# Patient Record
Sex: MALE | Race: WHITE | NOT HISPANIC OR LATINO | Employment: OTHER | URBAN - METROPOLITAN AREA
[De-identification: names, ages, dates, MRNs, and addresses within clinical notes are randomized per-mention and may not be internally consistent; named-entity substitution may affect disease eponyms.]

---

## 2017-02-16 ENCOUNTER — OFFICE VISIT (OUTPATIENT)
Dept: FAMILY MEDICINE CLINIC | Facility: CLINIC | Age: 67
End: 2017-02-16

## 2017-02-16 VITALS
HEART RATE: 82 BPM | HEIGHT: 73 IN | BODY MASS INDEX: 25.42 KG/M2 | SYSTOLIC BLOOD PRESSURE: 118 MMHG | DIASTOLIC BLOOD PRESSURE: 88 MMHG | OXYGEN SATURATION: 99 % | TEMPERATURE: 97.6 F | WEIGHT: 191.8 LBS

## 2017-02-16 DIAGNOSIS — J01.00 ACUTE NON-RECURRENT MAXILLARY SINUSITIS: Primary | ICD-10-CM

## 2017-02-16 PROCEDURE — 99213 OFFICE O/P EST LOW 20 MIN: CPT | Performed by: FAMILY MEDICINE

## 2017-02-16 RX ORDER — AZITHROMYCIN 250 MG/1
TABLET, FILM COATED ORAL
Qty: 6 TABLET | Refills: 0 | Status: SHIPPED | OUTPATIENT
Start: 2017-02-16 | End: 2017-11-27

## 2017-02-16 NOTE — PROGRESS NOTES
"  Chief Complaint   Patient presents with   • Nasal Congestion   • Cough   • Fatigue       Upper Respiratory Infection: Patient complains of symptoms of a URI, possible sinusitis. Symptoms include congestion, cough, fever and sore throat. Onset of symptoms was 2 weeks ago, gradually worsening since that time. He also c/o achiness, post nasal drip and productive cough with  yellow colored sputum for the past 1 week .  He is drinking plenty of fluids. Evaluation to date: none. Treatment to date: antihistamines and decongestants.    Vitals:    02/16/17 1027   BP: 118/88   BP Location: Left arm   Patient Position: Sitting   Cuff Size: Adult   Pulse: 82   Temp: 97.6 °F (36.4 °C)   TempSrc: Oral   SpO2: 99%   Weight: 191 lb 12.8 oz (87 kg)   Height: 73\" (185.4 cm)     Gen: mildly ill appearing, alert  Ears: Tm's bulging without redness  Nose:  Congestion  Throat:  Red without exudate, some drainage, tonsils okay  Neck: no LAD  Lung: , good air movement, regular RR  Heart: RR without murmur  Skin: no rash.      Assessment/Plan   Tim was seen today for nasal congestion, cough and fatigue.    Diagnoses and all orders for this visit:    Acute non-recurrent maxillary sinusitis  -     azithromycin (ZITHROMAX) 250 MG tablet; Take 2 tablets the first day, then 1 tablet daily for 4 days.             Tylenol or Advil as needed for pain, fever, muscle aches  Plenty of fluids  Hand washing discussed  Off work or school note given if needed.  Warm tea for throat.    Dr. Gagandeep Romero MD  [place], [place]  [facility]  "

## 2017-03-20 ENCOUNTER — OUTSIDE FACILITY SERVICE (OUTPATIENT)
Dept: SURGERY | Facility: CLINIC | Age: 67
End: 2017-03-20

## 2017-03-20 PROCEDURE — G0105 COLORECTAL SCRN; HI RISK IND: HCPCS | Performed by: SURGERY

## 2017-11-11 DIAGNOSIS — I10 ESSENTIAL HYPERTENSION: ICD-10-CM

## 2017-11-13 DIAGNOSIS — I10 ESSENTIAL HYPERTENSION: ICD-10-CM

## 2017-11-13 RX ORDER — LISINOPRIL 40 MG/1
TABLET ORAL
Qty: 30 TABLET | Refills: 0 | Status: SHIPPED | OUTPATIENT
Start: 2017-11-13 | End: 2017-11-27 | Stop reason: SDUPTHER

## 2017-11-13 RX ORDER — LISINOPRIL 40 MG/1
TABLET ORAL
Qty: 30 TABLET | Refills: 0 | Status: SHIPPED | OUTPATIENT
Start: 2017-11-13 | End: 2017-11-27

## 2017-11-20 DIAGNOSIS — R35.1 NOCTURIA: ICD-10-CM

## 2017-11-20 DIAGNOSIS — E78.2 MIXED HYPERLIPIDEMIA: Primary | ICD-10-CM

## 2017-11-20 DIAGNOSIS — I10 ESSENTIAL HYPERTENSION: ICD-10-CM

## 2017-11-20 DIAGNOSIS — Z12.5 SCREENING FOR PROSTATE CANCER: ICD-10-CM

## 2017-11-20 DIAGNOSIS — R53.83 FATIGUE, UNSPECIFIED TYPE: ICD-10-CM

## 2017-11-20 DIAGNOSIS — Z00.00 ROUTINE ADULT HEALTH MAINTENANCE: ICD-10-CM

## 2017-11-20 DIAGNOSIS — Z51.81 MEDICATION MONITORING ENCOUNTER: ICD-10-CM

## 2017-11-21 LAB
ALT SERPL-CCNC: 45 U/L (ref 5–41)
APPEARANCE UR: CLEAR
BACTERIA #/AREA URNS HPF: ABNORMAL /HPF
BILIRUB UR QL STRIP: NEGATIVE
BUN SERPL-MCNC: 22 MG/DL (ref 8–23)
BUN/CREAT SERPL: 15.3 (ref 7–25)
CALCIUM SERPL-MCNC: 9.6 MG/DL (ref 8.8–10.5)
CASTS URNS MICRO: ABNORMAL
CHLORIDE SERPL-SCNC: 97 MMOL/L (ref 98–107)
CHOLEST SERPL-MCNC: 191 MG/DL (ref 0–200)
CO2 SERPL-SCNC: 27.8 MMOL/L (ref 22–29)
COLOR UR: YELLOW
CREAT SERPL-MCNC: 1.44 MG/DL (ref 0.76–1.27)
CRYSTALS URNS MICRO: ABNORMAL
EPI CELLS #/AREA URNS HPF: ABNORMAL /HPF
ERYTHROCYTE [DISTWIDTH] IN BLOOD BY AUTOMATED COUNT: 12.5 % (ref 11.5–14.5)
GFR SERPLBLD CREATININE-BSD FMLA CKD-EPI: 49 ML/MIN/1.73
GFR SERPLBLD CREATININE-BSD FMLA CKD-EPI: 59 ML/MIN/1.73
GLUCOSE SERPL-MCNC: 105 MG/DL (ref 65–99)
GLUCOSE UR QL: NEGATIVE
HCT VFR BLD AUTO: 46.8 % (ref 42–52)
HDLC SERPL-MCNC: 37 MG/DL (ref 40–60)
HGB BLD-MCNC: 15.7 G/DL (ref 14–18)
HGB UR QL STRIP: NEGATIVE
KETONES UR QL STRIP: NEGATIVE
LDLC SERPL CALC-MCNC: 90 MG/DL (ref 0–100)
LDLC/HDLC SERPL: 2.42 {RATIO}
LEUKOCYTE ESTERASE UR QL STRIP: NEGATIVE
MCH RBC QN AUTO: 31.2 PG (ref 27–31)
MCHC RBC AUTO-ENTMCNC: 33.5 G/DL (ref 31–37)
MCV RBC AUTO: 93 FL (ref 80–94)
NITRITE UR QL STRIP: NEGATIVE
PH UR STRIP: 6 [PH] (ref 4.5–8)
PLATELET # BLD AUTO: 191 10*3/MM3 (ref 140–500)
POTASSIUM SERPL-SCNC: 4.7 MMOL/L (ref 3.5–5.2)
PROT UR QL STRIP: ABNORMAL
PSA SERPL-MCNC: 0.5 NG/ML (ref 0–4)
RBC # BLD AUTO: 5.03 10*6/MM3 (ref 4.7–6.1)
RBC #/AREA URNS HPF: ABNORMAL /HPF
SODIUM SERPL-SCNC: 138 MMOL/L (ref 136–145)
SP GR UR: 1.02 (ref 1–1.03)
TRIGL SERPL-MCNC: 322 MG/DL (ref 0–150)
TSH SERPL DL<=0.005 MIU/L-ACNC: 2.76 MIU/ML (ref 0.27–4.2)
UROBILINOGEN UR STRIP-MCNC: ABNORMAL MG/DL
VLDLC SERPL CALC-MCNC: 64.4 MG/DL (ref 8–32)
WBC # BLD AUTO: 6.3 10*3/MM3 (ref 4.8–10.8)
WBC #/AREA URNS HPF: ABNORMAL /HPF

## 2017-11-27 ENCOUNTER — OFFICE VISIT (OUTPATIENT)
Dept: FAMILY MEDICINE CLINIC | Facility: CLINIC | Age: 67
End: 2017-11-27

## 2017-11-27 VITALS
DIASTOLIC BLOOD PRESSURE: 68 MMHG | HEART RATE: 68 BPM | OXYGEN SATURATION: 96 % | TEMPERATURE: 98.2 F | WEIGHT: 195.5 LBS | BODY MASS INDEX: 25.91 KG/M2 | SYSTOLIC BLOOD PRESSURE: 100 MMHG | HEIGHT: 73 IN

## 2017-11-27 DIAGNOSIS — E78.2 MIXED HYPERLIPIDEMIA: ICD-10-CM

## 2017-11-27 DIAGNOSIS — I10 ESSENTIAL HYPERTENSION: Primary | ICD-10-CM

## 2017-11-27 DIAGNOSIS — R79.89 ELEVATED SERUM CREATININE: ICD-10-CM

## 2017-11-27 PROCEDURE — 99213 OFFICE O/P EST LOW 20 MIN: CPT | Performed by: FAMILY MEDICINE

## 2017-11-27 RX ORDER — PRAVASTATIN SODIUM 40 MG
40 TABLET ORAL DAILY
Qty: 90 TABLET | Refills: 3 | Status: SHIPPED | OUTPATIENT
Start: 2017-11-27 | End: 2018-12-18 | Stop reason: SDUPTHER

## 2017-11-27 RX ORDER — LISINOPRIL 40 MG/1
40 TABLET ORAL DAILY
Qty: 90 TABLET | Refills: 3 | Status: SHIPPED | OUTPATIENT
Start: 2017-11-27 | End: 2018-11-26 | Stop reason: SDUPTHER

## 2017-11-27 RX ORDER — HYDROCHLOROTHIAZIDE 25 MG/1
25 TABLET ORAL DAILY
Qty: 90 TABLET | Refills: 3 | Status: SHIPPED | OUTPATIENT
Start: 2017-11-27 | End: 2018-12-18 | Stop reason: SDUPTHER

## 2017-11-27 NOTE — PROGRESS NOTES
Chief Complaint   Patient presents with   • Follow-up   • Hypertension   • Hyperlipidemia       Subjective     Patient here for follow-up of elevated blood pressure.    He is not exercising and is adherent to a low-salt diet.    Blood pressure is well controlled at home.   Cardiac symptoms: cough.   Patient denies: chest pressure/discomfort, claudication, dyspnea, exertional chest pressure/discomfort, fatigue, irregular heart beat, lower extremity edema, near-syncope and orthopnea.   Cardiovascular risk factors: advanced age (older than 55 for men, 65 for women), dyslipidemia, family history of premature cardiovascular disease, hypertension, male gender and smoking/ tobacco exposure.   Use of agents associated with hypertension: none.   History of target organ damage: none.  Patient is taking prescribed hypertension medications as prescribed without side effects.    Great news he stopped smoking in July.  After oral surgeon told him to stop prior to dental implants.    SCr is up,  He did not drink water am of labs, will repeat.        The following portions of the patient's history were reviewed and updated as appropriate: allergies, current medications, past family history, past medical history, past social history, past surgical history and problem list.    Review of Systems  Pertinent items are noted in HPI.    Results for orders placed or performed in visit on 11/20/17   PSA   Result Value Ref Range    PSA 0.499 0.000 - 4.000 ng/mL   Basic metabolic panel   Result Value Ref Range    Glucose 105 (H) 65 - 99 mg/dL    BUN 22 8 - 23 mg/dL    Creatinine 1.44 (H) 0.76 - 1.27 mg/dL    eGFR Non African Am 49 (L) >60 mL/min/1.73    eGFR African Am 59 (L) >60 mL/min/1.73    BUN/Creatinine Ratio 15.3 7.0 - 25.0    Sodium 138 136 - 145 mmol/L    Potassium 4.7 3.5 - 5.2 mmol/L    Chloride 97 (L) 98 - 107 mmol/L    Total CO2 27.8 22.0 - 29.0 mmol/L    Calcium 9.6 8.8 - 10.5 mg/dL   ALT   Result Value Ref Range    ALT (SGPT)  "45 (H) 5 - 41 U/L   CBC (No Diff)   Result Value Ref Range    WBC 6.30 4.80 - 10.80 10*3/mm3    RBC 5.03 4.70 - 6.10 10*6/mm3    Hemoglobin 15.7 14.0 - 18.0 g/dL    Hematocrit 46.8 42.0 - 52.0 %    MCV 93.0 80.0 - 94.0 fL    MCH 31.2 (H) 27.0 - 31.0 pg    MCHC 33.5 31.0 - 37.0 g/dL    RDW 12.5 11.5 - 14.5 %    Platelets 191 140 - 500 10*3/mm3   TSH   Result Value Ref Range    TSH 2.760 0.270 - 4.200 mIU/mL   Urinalysis With / Microscopic If Indicated - Urine, Clean Catch   Result Value Ref Range    Specific Gravity, UA 1.025 1.003 - 1.030    pH, UA 6.0 4.5 - 8.0    Color, UA Yellow     Appearance, UA Clear Clear    Leukocytes, UA Negative Negative    Protein See below: (A) Negative    Glucose, UA Negative Negative    Ketones Negative     Blood, UA Negative Negative    Bilirubin, UA Negative Negative    Urobilinogen, UA Comment     Nitrite, UA Negative Negative   Lipid Panel With LDL / HDL Ratio   Result Value Ref Range    Total Cholesterol 191 0 - 200 mg/dL    Triglycerides 322 (H) 0 - 150 mg/dL    HDL Cholesterol 37 (L) 40 - 60 mg/dL    VLDL Cholesterol 64.4 (H) 8 - 32 mg/dL    LDL Cholesterol  90 0 - 100 mg/dL    LDL/HDL Ratio 2.42    Microscopic Examination   Result Value Ref Range    WBC, UA 0-2 (A) None Seen /hpf    RBC, UA 0-2 (A) None Seen /hpf    Epithelial Cells (non renal) 0-2 /hpf    Cast Type Comment     Crystal Type Comment     Bacteria, UA Comment None Seen /hpf        Vitals:    11/27/17 1052   BP: 100/68   BP Location: Left arm   Patient Position: Sitting   Pulse: 68   Temp: 98.2 °F (36.8 °C)   SpO2: 96%   Weight: 195 lb 8 oz (88.7 kg)   Height: 73\" (185.4 cm)     Objective    Gen: alert, pleasant.  HEENT: PERRL, EOMI intact, lids ok, ear canals clear, TMs normal, throat clear, nostrils normal  Neck: no bruit, no enlarged thyroid  Lungs: CTA  Heart: RR no murmur  ABD: soft , + BS  Pulses: intact  Mood: stable     Assessment/Plan   Hypertension, normal blood pressure Evidence of target organ damage: " none.    Tim was seen today for follow-up, hypertension and hyperlipidemia.    Diagnoses and all orders for this visit:    Essential hypertension  -     hydrochlorothiazide (HYDRODIURIL) 25 MG tablet; Take 1 tablet by mouth Daily. Indications: High Blood Pressure Disorder  -     lisinopril (PRINIVIL,ZESTRIL) 40 MG tablet; Take 1 tablet by mouth Daily. Indications: High Blood Pressure Disorder    Mixed hyperlipidemia  -     pravastatin (PRAVACHOL) 40 MG tablet; Take 1 tablet by mouth Daily.    Elevated serum creatinine  -     Cancel: Basic Metabolic Panel  -     Basic Metabolic Panel; Future      Medication: no change.  Follow up: 6 months and as needed.    There are no Patient Instructions on file for this visit.  Medications Discontinued During This Encounter   Medication Reason   • azithromycin (ZITHROMAX) 250 MG tablet Therapy completed   • lisinopril (PRINIVIL,ZESTRIL) 40 MG tablet Therapy completed   • hydrochlorothiazide (HYDRODIURIL) 25 MG tablet Reorder   • lisinopril (PRINIVIL,ZESTRIL) 40 MG tablet Reorder   • pravastatin (PRAVACHOL) 40 MG tablet Reorder        Return in about 6 months (around 5/27/2018) for Medicare Wellness visit, blood pressure.    Limit salt  Limit alcoholic drinks to 1 a day  Limit caffeine to 1-2 servings a day    Dr. Gagandeep Romero MD  [place], [place]  [facility].

## 2017-11-29 LAB
BUN SERPL-MCNC: 25 MG/DL (ref 8–23)
BUN/CREAT SERPL: 18 (ref 7–25)
CALCIUM SERPL-MCNC: 9.4 MG/DL (ref 8.8–10.5)
CHLORIDE SERPL-SCNC: 96 MMOL/L (ref 98–107)
CO2 SERPL-SCNC: 32.6 MMOL/L (ref 22–29)
CREAT SERPL-MCNC: 1.39 MG/DL (ref 0.76–1.27)
GFR SERPLBLD CREATININE-BSD FMLA CKD-EPI: 51 ML/MIN/1.73
GFR SERPLBLD CREATININE-BSD FMLA CKD-EPI: 62 ML/MIN/1.73
GLUCOSE SERPL-MCNC: 134 MG/DL (ref 65–99)
POTASSIUM SERPL-SCNC: 4.2 MMOL/L (ref 3.5–5.2)
SODIUM SERPL-SCNC: 137 MMOL/L (ref 136–145)

## 2017-12-02 ENCOUNTER — RESULTS ENCOUNTER (OUTPATIENT)
Dept: FAMILY MEDICINE CLINIC | Facility: CLINIC | Age: 67
End: 2017-12-02

## 2017-12-02 DIAGNOSIS — R79.89 ELEVATED SERUM CREATININE: ICD-10-CM

## 2018-06-11 DIAGNOSIS — Z00.00 ROUTINE ADULT HEALTH MAINTENANCE: ICD-10-CM

## 2018-06-11 DIAGNOSIS — I10 ESSENTIAL HYPERTENSION: ICD-10-CM

## 2018-06-11 DIAGNOSIS — R53.83 FATIGUE, UNSPECIFIED TYPE: ICD-10-CM

## 2018-06-11 DIAGNOSIS — Z51.81 MEDICATION MONITORING ENCOUNTER: ICD-10-CM

## 2018-06-11 DIAGNOSIS — E78.2 MIXED HYPERLIPIDEMIA: Primary | ICD-10-CM

## 2018-06-11 LAB
ALT SERPL-CCNC: 60 U/L (ref 5–41)
APPEARANCE UR: CLEAR
BACTERIA #/AREA URNS HPF: ABNORMAL /HPF
BILIRUB UR QL STRIP: NEGATIVE
BUN SERPL-MCNC: 19 MG/DL (ref 8–23)
BUN/CREAT SERPL: 16.1 (ref 7–25)
CALCIUM SERPL-MCNC: 9.4 MG/DL (ref 8.8–10.5)
CHLORIDE SERPL-SCNC: 99 MMOL/L (ref 98–107)
CHOLEST SERPL-MCNC: 188 MG/DL (ref 0–200)
CO2 SERPL-SCNC: 26.2 MMOL/L (ref 22–29)
COLOR UR: YELLOW
CREAT SERPL-MCNC: 1.18 MG/DL (ref 0.76–1.27)
EPI CELLS #/AREA URNS HPF: ABNORMAL /HPF
ERYTHROCYTE [DISTWIDTH] IN BLOOD BY AUTOMATED COUNT: 12.4 % (ref 11.5–14.5)
GFR SERPLBLD CREATININE-BSD FMLA CKD-EPI: 61 ML/MIN/1.73
GFR SERPLBLD CREATININE-BSD FMLA CKD-EPI: 74 ML/MIN/1.73
GLUCOSE SERPL-MCNC: 127 MG/DL (ref 65–99)
GLUCOSE UR QL: NEGATIVE
HCT VFR BLD AUTO: 46.6 % (ref 42–52)
HDLC SERPL-MCNC: 34 MG/DL (ref 40–60)
HGB BLD-MCNC: 16 G/DL (ref 14–18)
HGB UR QL STRIP: NEGATIVE
KETONES UR QL STRIP: ABNORMAL
LDLC SERPL CALC-MCNC: 82 MG/DL (ref 0–100)
LDLC/HDLC SERPL: 2.41 {RATIO}
LEUKOCYTE ESTERASE UR QL STRIP: NEGATIVE
MCH RBC QN AUTO: 32 PG (ref 27–31)
MCHC RBC AUTO-ENTMCNC: 34.3 G/DL (ref 31–37)
MCV RBC AUTO: 93.2 FL (ref 80–94)
MUCOUS THREADS URNS QL MICRO: ABNORMAL /HPF
NITRITE UR QL STRIP: NEGATIVE
PH UR STRIP: 7 [PH] (ref 4.5–8)
PLATELET # BLD AUTO: 178 10*3/MM3 (ref 140–500)
POTASSIUM SERPL-SCNC: 4.1 MMOL/L (ref 3.5–5.2)
PROT UR QL STRIP: ABNORMAL
RBC # BLD AUTO: 5 10*6/MM3 (ref 4.7–6.1)
RBC #/AREA URNS HPF: ABNORMAL /HPF
SODIUM SERPL-SCNC: 136 MMOL/L (ref 136–145)
SP GR UR: 1.02 (ref 1–1.03)
TRIGL SERPL-MCNC: 361 MG/DL (ref 0–150)
TSH SERPL DL<=0.005 MIU/L-ACNC: 2.14 MIU/ML (ref 0.27–4.2)
UROBILINOGEN UR STRIP-MCNC: ABNORMAL MG/DL
VLDLC SERPL CALC-MCNC: 72.2 MG/DL (ref 8–32)
WBC # BLD AUTO: 6.9 10*3/MM3 (ref 4.8–10.8)
WBC #/AREA URNS HPF: ABNORMAL /HPF

## 2018-06-12 DIAGNOSIS — R73.9 HYPERGLYCEMIA: ICD-10-CM

## 2018-06-12 DIAGNOSIS — R73.9 HYPERGLYCEMIA: Primary | ICD-10-CM

## 2018-06-12 LAB
HBA1C MFR BLD: 6.5 % (ref 4.8–5.6)
Lab: NORMAL
WRITTEN AUTHORIZATION: NORMAL

## 2018-06-18 ENCOUNTER — OFFICE VISIT (OUTPATIENT)
Dept: FAMILY MEDICINE CLINIC | Facility: CLINIC | Age: 68
End: 2018-06-18

## 2018-06-18 VITALS
HEIGHT: 73 IN | WEIGHT: 194 LBS | DIASTOLIC BLOOD PRESSURE: 70 MMHG | TEMPERATURE: 98 F | BODY MASS INDEX: 25.71 KG/M2 | OXYGEN SATURATION: 96 % | HEART RATE: 66 BPM | SYSTOLIC BLOOD PRESSURE: 114 MMHG

## 2018-06-18 DIAGNOSIS — F17.200 CURRENT SMOKER: ICD-10-CM

## 2018-06-18 DIAGNOSIS — Z12.5 SCREENING FOR PROSTATE CANCER: ICD-10-CM

## 2018-06-18 DIAGNOSIS — Z00.00 ROUTINE ADULT HEALTH MAINTENANCE: ICD-10-CM

## 2018-06-18 DIAGNOSIS — Z00.00 MEDICARE ANNUAL WELLNESS VISIT, SUBSEQUENT: ICD-10-CM

## 2018-06-18 DIAGNOSIS — R74.01 ELEVATED ALT MEASUREMENT: ICD-10-CM

## 2018-06-18 DIAGNOSIS — E78.2 MIXED HYPERLIPIDEMIA: ICD-10-CM

## 2018-06-18 DIAGNOSIS — Z51.81 MEDICATION MONITORING ENCOUNTER: ICD-10-CM

## 2018-06-18 DIAGNOSIS — R73.9 HYPERGLYCEMIA: ICD-10-CM

## 2018-06-18 DIAGNOSIS — I10 ESSENTIAL HYPERTENSION: Primary | ICD-10-CM

## 2018-06-18 DIAGNOSIS — R79.89 ELEVATED SERUM CREATININE: ICD-10-CM

## 2018-06-18 PROCEDURE — 99406 BEHAV CHNG SMOKING 3-10 MIN: CPT | Performed by: FAMILY MEDICINE

## 2018-06-18 PROCEDURE — G0439 PPPS, SUBSEQ VISIT: HCPCS | Performed by: FAMILY MEDICINE

## 2018-06-18 NOTE — PROGRESS NOTES
QUICK REFERENCE INFORMATION:  The ABCs of the Annual Wellness Visit    Subsequent Medicare Wellness Visit    HEALTH RISK ASSESSMENT    1950    Recent Hospitalizations:  No hospitalization(s) within the last year..        Current Medical Providers:  Patient Care Team:  Gagandeep Romero MD as PCP - General (Family Medicine)  Gagandeep Romero MD as PCP - Claims Attributed        Smoking Status:  History   Smoking Status   • Current Every Day Smoker   • Packs/day: 1.00   • Types: Cigarettes   Smokeless Tobacco   • Never Used       Alcohol Consumption:  History   Alcohol Use   • Yes     Comment: 2 per day       Depression Screen:   PHQ-2/PHQ-9 Depression Screening 6/18/2018   Little interest or pleasure in doing things 0   Feeling down, depressed, or hopeless 0   Total Score 0       Health Habits and Functional and Cognitive Screening:  Functional & Cognitive Status 6/18/2018   Do you have difficulty preparing food and eating? No   Do you have difficulty bathing yourself, getting dressed or grooming yourself? No   Do you have difficulty using the toilet? No   Do you have difficulty moving around from place to place? No   Do you have trouble with steps or getting out of a bed or a chair? No   In the past year have you fallen or experienced a near fall? No   Current Diet Well Balanced Diet   Dental Exam Up to date   Eye Exam Up to date   Exercise (times per week) 4 times per week   Current Exercise Activities Include Gardening   Do you need help using the phone?  No   Are you deaf or do you have serious difficulty hearing?  No   Do you need help with transportation? No   Do you need help shopping? No   Do you need help preparing meals?  No   Do you need help with housework?  No   Do you need help with laundry? No   Do you need help taking your medications? No   Do you need help managing money? No   Do you ever drive or ride in a car without wearing a seat belt? No   Have you felt unusual stress, anger or  loneliness in the last month? No   Who do you live with? Spouse   If you need help, do you have trouble finding someone available to you? No   Have you been bothered in the last four weeks by sexual problems? No   Do you have difficulty concentrating, remembering or making decisions? No           Does the patient have evidence of cognitive impairment? No    Aspirin use counseling: Taking ASA appropriately as indicated      Recent Lab Results:  CMP:  Lab Results   Component Value Date     (H) 06/11/2018    BUN 19 06/11/2018    CREATININE 1.18 06/11/2018    EGFRIFNONA 61 06/11/2018    EGFRIFAFRI 74 06/11/2018    BCR 16.1 06/11/2018     06/11/2018    K 4.1 06/11/2018    CO2 26.2 06/11/2018    CALCIUM 9.4 06/11/2018    PROTENTOTREF 6.7 05/18/2015    ALBUMIN 4.1 05/18/2015    LABGLOBREF 2.6 05/18/2015    LABIL2 2 05/18/2015    BILITOT 1.1 05/18/2015    ALKPHOS 99 05/18/2015    AST 23 05/18/2015    ALT 60 (H) 06/11/2018     Lipid Panel:  Lab Results   Component Value Date    TRIG 361 (H) 06/11/2018    HDL 34 (L) 06/11/2018    VLDL 72.2 (H) 06/11/2018    LDLHDL 2.41 06/11/2018     HbA1c:  Lab Results   Component Value Date    HGBA1C 6.50 (H) 06/11/2018       Visual Acuity:  No exam data present    Age-appropriate Screening Schedule:  Refer to the list below for future screening recommendations based on patient's age, sex and/or medical conditions. Orders for these recommended tests are listed in the plan section. The patient has been provided with a written plan.    Health Maintenance   Topic Date Due   • TDAP/TD VACCINES (1 - Tdap) 01/15/1969   • ZOSTER VACCINE (1 of 2) 01/15/2000   • PNEUMOCOCCAL VACCINES (65+ LOW/MEDIUM RISK) (1 of 2 - PCV13) 01/15/2015   • INFLUENZA VACCINE  08/01/2018   • LIPID PANEL  06/11/2019   • COLONOSCOPY  03/20/2027        Subjective   History of Present Illness    Tim Kelly is a 68 y.o. male who presents for an Subsequent Wellness Visit.    The following portions of the  patient's history were reviewed and updated as appropriate: allergies, current medications, past family history, past medical history, past social history, past surgical history and problem list.    Outpatient Medications Prior to Visit   Medication Sig Dispense Refill   • aspirin 81 MG chewable tablet Chew.     • hydrochlorothiazide (HYDRODIURIL) 25 MG tablet Take 1 tablet by mouth Daily. Indications: High Blood Pressure Disorder 90 tablet 3   • lisinopril (PRINIVIL,ZESTRIL) 40 MG tablet Take 1 tablet by mouth Daily. Indications: High Blood Pressure Disorder 90 tablet 3   • pravastatin (PRAVACHOL) 40 MG tablet Take 1 tablet by mouth Daily. 90 tablet 3     No facility-administered medications prior to visit.        Patient Active Problem List   Diagnosis   • Mixed hyperlipidemia   • Essential hypertension   • Routine adult health maintenance   • Screening for prostate cancer   • Current smoker   • Medication monitoring encounter   • Elevated serum creatinine   • Medicare annual wellness visit, subsequent   • Hyperglycemia   • Elevated ALT measurement       Advance Care Planning:  has an advance directive - a copy HAS NOT been provided    Identification of Risk Factors:  Risk factors include: cardiovascular risk, tobacco use and alcohol use.    Review of Systems    Compared to one year ago, the patient feels his physical health is the same.  Compared to one year ago, the patient feels his mental health is the same.    Objective     Physical Exam   Constitutional: He appears well-developed and well-nourished.   HENT:   Mouth/Throat: Oropharynx is clear and moist.   Eyes: Conjunctivae are normal.   Neck: Normal range of motion.   Cardiovascular: Normal rate.    Pulmonary/Chest: Effort normal.   Abdominal: Soft.   Neurological: He is alert.   Psychiatric: He has a normal mood and affect.   Nursing note and vitals reviewed.      Vitals:    06/18/18 0847   BP: 114/70   BP Location: Left arm   Patient Position: Sitting  "  Pulse: 66   Temp: 98 °F (36.7 °C)   SpO2: 96%   Weight: 88 kg (194 lb)   Height: 185.4 cm (73\")   PainSc: 0-No pain       Patient's Body mass index is 25.6 kg/m². BMI is within normal parameters. No follow-up required.      Assessment/Plan   Patient Self-Management and Personalized Health Advice  The patient has been provided with information about: diet, exercise, weight management, prevention of cardiac or vascular disease, the relationship between weight and GERD and alcohol use and preventive services including:   · Diabetes screening, see lab orders, Prostate cancer screening discussed, Smoking cessation counseling completed.    Visit Diagnoses:    ICD-10-CM ICD-9-CM   1. Essential hypertension I10 401.9   2. Medication monitoring encounter Z51.81 V58.83   3. Mixed hyperlipidemia E78.2 272.2   4. Routine adult health maintenance Z00.00 V70.0   5. Medicare annual wellness visit, subsequent Z00.00 V70.0   6. Current smoker F17.200 305.1   7. Hyperglycemia R73.9 790.29   8. Elevated ALT measurement R74.0 790.4   9. Elevated serum creatinine R79.89 790.99   10. Screening for prostate cancer Z12.5 V76.44       Orders Placed This Encounter   Procedures   • Basic Metabolic Panel     Standing Status:   Future     Standing Expiration Date:   6/19/2019   • PSA Screen     Standing Status:   Future     Standing Expiration Date:   6/18/2019   • Hepatic Function Panel     Standing Status:   Future     Standing Expiration Date:   6/18/2019   • Hemoglobin A1c     Standing Status:   Future       Outpatient Encounter Prescriptions as of 6/18/2018   Medication Sig Dispense Refill   • aspirin 81 MG chewable tablet Chew.     • hydrochlorothiazide (HYDRODIURIL) 25 MG tablet Take 1 tablet by mouth Daily. Indications: High Blood Pressure Disorder 90 tablet 3   • lisinopril (PRINIVIL,ZESTRIL) 40 MG tablet Take 1 tablet by mouth Daily. Indications: High Blood Pressure Disorder 90 tablet 3   • pravastatin (PRAVACHOL) 40 MG tablet Take " 1 tablet by mouth Daily. 90 tablet 3     No facility-administered encounter medications on file as of 6/18/2018.        Reviewed use of high risk medication in the elderly: not applicable  Reviewed for potential of harmful drug interactions in the elderly: not applicable     Got his dental implants  But he started up smoking again  SCr is better, with water on lab day.  But sugar is up some.  Retired now  Growing a garden.  Reviewed labs  Bp ok  ALT is up some , due to statin and nightly wine.      Follow Up:  Return in about 6 months (around 12/18/2018) for blood pressure.     An After Visit Summary and PPPS with all of these plans were given to the patient.

## 2018-06-18 NOTE — PATIENT INSTRUCTIONS
"\"I advised the patient of the risks in continuing to use tobacco,\" and \"I provided this patient with smoking cessation educational materials and discussed how to quit smoking,\" and \"patient has NOT expressed the willingness to quit\". 3 min talk    "

## 2018-11-26 DIAGNOSIS — I10 ESSENTIAL HYPERTENSION: ICD-10-CM

## 2018-11-26 RX ORDER — LISINOPRIL 40 MG/1
40 TABLET ORAL DAILY
Qty: 90 TABLET | Refills: 0 | Status: SHIPPED | OUTPATIENT
Start: 2018-11-26 | End: 2018-12-18 | Stop reason: SDUPTHER

## 2018-12-07 ENCOUNTER — RESULTS ENCOUNTER (OUTPATIENT)
Dept: FAMILY MEDICINE CLINIC | Facility: CLINIC | Age: 68
End: 2018-12-07

## 2018-12-07 DIAGNOSIS — R74.01 ELEVATED ALT MEASUREMENT: ICD-10-CM

## 2018-12-07 DIAGNOSIS — Z12.5 SCREENING FOR PROSTATE CANCER: ICD-10-CM

## 2018-12-07 DIAGNOSIS — R73.9 HYPERGLYCEMIA: ICD-10-CM

## 2018-12-07 DIAGNOSIS — R79.89 ELEVATED SERUM CREATININE: ICD-10-CM

## 2018-12-07 DIAGNOSIS — I10 ESSENTIAL HYPERTENSION: ICD-10-CM

## 2018-12-11 LAB
ALBUMIN SERPL-MCNC: 4.5 G/DL (ref 3.5–5.2)
ALP SERPL-CCNC: 80 U/L (ref 40–129)
ALT SERPL-CCNC: 56 U/L (ref 5–41)
AST SERPL-CCNC: 38 U/L (ref 5–40)
BILIRUB DIRECT SERPL-MCNC: 0.2 MG/DL (ref 0.2–0.3)
BILIRUB SERPL-MCNC: 1.4 MG/DL (ref 0.2–1.2)
BUN SERPL-MCNC: 17 MG/DL (ref 8–23)
BUN/CREAT SERPL: 14 (ref 7–25)
CALCIUM SERPL-MCNC: 9.7 MG/DL (ref 8.8–10.5)
CHLORIDE SERPL-SCNC: 100 MMOL/L (ref 98–107)
CO2 SERPL-SCNC: 27.8 MMOL/L (ref 22–29)
CREAT SERPL-MCNC: 1.21 MG/DL (ref 0.76–1.27)
GLUCOSE SERPL-MCNC: 138 MG/DL (ref 65–99)
HBA1C MFR BLD: 6.4 % (ref 4.8–5.6)
POTASSIUM SERPL-SCNC: 4.1 MMOL/L (ref 3.5–5.2)
PROT SERPL-MCNC: 7.1 G/DL (ref 6–8.5)
PSA SERPL-MCNC: 0.47 NG/ML (ref 0–4)
SODIUM SERPL-SCNC: 142 MMOL/L (ref 136–145)

## 2018-12-18 ENCOUNTER — OFFICE VISIT (OUTPATIENT)
Dept: FAMILY MEDICINE CLINIC | Facility: CLINIC | Age: 68
End: 2018-12-18

## 2018-12-18 VITALS
OXYGEN SATURATION: 97 % | WEIGHT: 191 LBS | BODY MASS INDEX: 25.31 KG/M2 | DIASTOLIC BLOOD PRESSURE: 80 MMHG | SYSTOLIC BLOOD PRESSURE: 114 MMHG | HEIGHT: 73 IN | HEART RATE: 95 BPM

## 2018-12-18 DIAGNOSIS — E78.2 MIXED HYPERLIPIDEMIA: ICD-10-CM

## 2018-12-18 DIAGNOSIS — R73.03 PREDIABETES: ICD-10-CM

## 2018-12-18 DIAGNOSIS — I10 ESSENTIAL HYPERTENSION: Primary | ICD-10-CM

## 2018-12-18 PROCEDURE — 99213 OFFICE O/P EST LOW 20 MIN: CPT | Performed by: FAMILY MEDICINE

## 2018-12-18 RX ORDER — HYDROCHLOROTHIAZIDE 25 MG/1
25 TABLET ORAL DAILY
Qty: 90 TABLET | Refills: 3 | Status: SHIPPED | OUTPATIENT
Start: 2018-12-18 | End: 2020-01-03 | Stop reason: SDUPTHER

## 2018-12-18 RX ORDER — LISINOPRIL 40 MG/1
40 TABLET ORAL DAILY
Qty: 90 TABLET | Refills: 2 | Status: SHIPPED | OUTPATIENT
Start: 2018-12-18 | End: 2019-06-18 | Stop reason: SDUPTHER

## 2018-12-18 RX ORDER — PRAVASTATIN SODIUM 40 MG
40 TABLET ORAL DAILY
Qty: 90 TABLET | Refills: 3 | Status: SHIPPED | OUTPATIENT
Start: 2018-12-18 | End: 2020-01-03 | Stop reason: SDUPTHER

## 2018-12-18 NOTE — PROGRESS NOTES
Chief Complaint   Patient presents with   • Follow-up   • Hyperlipidemia   • Hypertension       Subjective     Patient here for follow-up of elevated blood pressure.    He is exercising and is adherent to a low-salt diet.    Blood pressure is well controlled at home.   Cardiac symptoms: cough and dyspnea.   Patient denies: chest pain.   Cardiovascular risk factors: advanced age (older than 55 for men, 65 for women), hypertension, male gender and smoking/ tobacco exposure.   Use of agents associated with hypertension: none.   History of target organ damage: none.  Patient is taking prescribed hypertension medications as prescribed without side effects.    The following portions of the patient's history were reviewed and updated as appropriate: allergies, current medications, past family history, past medical history, past social history, past surgical history and problem list.    Review of Systems  Pertinent items are noted in HPI.    Results for orders placed or performed in visit on 12/07/18   Basic Metabolic Panel   Result Value Ref Range    Glucose 138 (H) 65 - 99 mg/dL    BUN 17 8 - 23 mg/dL    Creatinine 1.21 0.76 - 1.27 mg/dL    eGFR Non African Am 60 (L) >60 mL/min/1.73    eGFR African Am 72 >60 mL/min/1.73    BUN/Creatinine Ratio 14.0 7.0 - 25.0    Sodium 142 136 - 145 mmol/L    Potassium 4.1 3.5 - 5.2 mmol/L    Chloride 100 98 - 107 mmol/L    Total CO2 27.8 22.0 - 29.0 mmol/L    Calcium 9.7 8.8 - 10.5 mg/dL   PSA Screen   Result Value Ref Range    PSA 0.471 0.000 - 4.000 ng/mL   Hepatic Function Panel   Result Value Ref Range    Total Protein 7.1 6.0 - 8.5 g/dL    Albumin 4.50 3.50 - 5.20 g/dL    Total Bilirubin 1.4 (H) 0.2 - 1.2 mg/dL    Bilirubin, Direct 0.2 0.2 - 0.3 mg/dL    Alkaline Phosphatase 80 40 - 129 U/L    AST (SGOT) 38 5 - 40 U/L    ALT (SGPT) 56 (H) 5 - 41 U/L   Hemoglobin A1c   Result Value Ref Range    Hemoglobin A1C 6.40 (H) 4.80 - 5.60 %        Vitals:    12/18/18 1327   BP: 114/80   BP  "Location: Left arm   Patient Position: Sitting   Pulse: 95   SpO2: 97%   Weight: 86.6 kg (191 lb)   Height: 185.4 cm (73\")     BP Readings from Last 3 Encounters:   12/18/18 114/80   06/18/18 114/70   11/27/17 100/68     Objective      Gen: alert, pleasant.  HEENT: PERRL, EOMI intact, lids ok, ear canals clear, TMs normal, throat clear, nostrils normal  Neck: no bruit, no enlarged thyroid  Lungs: CTA  Heart: RR no murmur  ABD: soft , + BS  Pulses: intact  Mood: stable     Assessment/Plan   Hypertension, normal blood pressure Evidence of target organ damage: none.    Tim was seen today for follow-up, hyperlipidemia and hypertension.    Diagnoses and all orders for this visit:    Essential hypertension  -     hydrochlorothiazide (HYDRODIURIL) 25 MG tablet; Take 1 tablet by mouth Daily.  -     lisinopril (PRINIVIL,ZESTRIL) 40 MG tablet; Take 1 tablet by mouth Daily.    Mixed hyperlipidemia  -     pravastatin (PRAVACHOL) 40 MG tablet; Take 1 tablet by mouth Daily.    Prediabetes      Discussed healthy eating and reduction of sweets and starches    Still smoking 10 cigs a day.    Medication: no change.  Follow up: 6 months and as needed.    There are no Patient Instructions on file for this visit.  Medications Discontinued During This Encounter   Medication Reason   • hydrochlorothiazide (HYDRODIURIL) 25 MG tablet Reorder   • lisinopril (PRINIVIL,ZESTRIL) 40 MG tablet Reorder   • pravastatin (PRAVACHOL) 40 MG tablet Reorder        Return in about 6 months (around 6/18/2019) for Medicare Wellness visit, blood pressure.    Limit salt  Limit alcoholic drinks to 1 a day  Limit caffeine to 1-2 servings a day    Dr. Gagandeep Romero MD  [place], [place]  [facility].  "

## 2019-04-22 ENCOUNTER — APPOINTMENT (OUTPATIENT)
Dept: GENERAL RADIOLOGY | Facility: HOSPITAL | Age: 69
End: 2019-04-22

## 2019-04-22 ENCOUNTER — HOSPITAL ENCOUNTER (EMERGENCY)
Facility: HOSPITAL | Age: 69
Discharge: HOME | End: 2019-04-23
Attending: EMERGENCY MEDICINE | Admitting: EMERGENCY MEDICINE

## 2019-04-22 VITALS
DIASTOLIC BLOOD PRESSURE: 84 MMHG | RESPIRATION RATE: 16 BRPM | WEIGHT: 195.9 LBS | HEART RATE: 73 BPM | BODY MASS INDEX: 26.53 KG/M2 | TEMPERATURE: 97.6 F | OXYGEN SATURATION: 98 % | HEIGHT: 72 IN | SYSTOLIC BLOOD PRESSURE: 128 MMHG

## 2019-04-22 DIAGNOSIS — E87.6 HYPOKALEMIA: ICD-10-CM

## 2019-04-22 DIAGNOSIS — R55 SYNCOPE, UNSPECIFIED SYNCOPE TYPE: Primary | ICD-10-CM

## 2019-04-22 LAB
ALBUMIN SERPL-MCNC: 3.7 G/DL (ref 3.5–5.2)
ALBUMIN/GLOB SERPL: 1.3 G/DL
ALP SERPL-CCNC: 69 U/L (ref 39–117)
ALT SERPL W P-5'-P-CCNC: 41 U/L (ref 1–41)
ANION GAP SERPL CALCULATED.3IONS-SCNC: 16.6 MMOL/L
AST SERPL-CCNC: 34 U/L (ref 1–40)
BASOPHILS # BLD AUTO: 0.04 10*3/MM3 (ref 0–0.2)
BASOPHILS NFR BLD AUTO: 0.7 % (ref 0–1.5)
BILIRUB SERPL-MCNC: 0.7 MG/DL (ref 0.2–1.2)
BUN BLD-MCNC: 16 MG/DL (ref 8–23)
BUN/CREAT SERPL: 14.2 (ref 7–25)
CALCIUM SPEC-SCNC: 8.9 MG/DL (ref 8.6–10.5)
CHLORIDE SERPL-SCNC: 99 MMOL/L (ref 98–107)
CO2 SERPL-SCNC: 21.4 MMOL/L (ref 22–29)
CREAT BLD-MCNC: 1.13 MG/DL (ref 0.76–1.27)
DEPRECATED RDW RBC AUTO: 43.7 FL (ref 37–54)
EOSINOPHIL # BLD AUTO: 0.15 10*3/MM3 (ref 0–0.4)
EOSINOPHIL NFR BLD AUTO: 2.4 % (ref 0.3–6.2)
ERYTHROCYTE [DISTWIDTH] IN BLOOD BY AUTOMATED COUNT: 12.7 % (ref 12.3–15.4)
GFR SERPL CREATININE-BSD FRML MDRD: 64 ML/MIN/1.73
GLOBULIN UR ELPH-MCNC: 2.8 GM/DL
GLUCOSE BLD-MCNC: 123 MG/DL (ref 65–99)
HCT VFR BLD AUTO: 42.5 % (ref 37.5–51)
HGB BLD-MCNC: 14 G/DL (ref 13–17.7)
IMM GRANULOCYTES # BLD AUTO: 0.02 10*3/MM3 (ref 0–0.05)
IMM GRANULOCYTES NFR BLD AUTO: 0.3 % (ref 0–0.5)
LYMPHOCYTES # BLD AUTO: 2.49 10*3/MM3 (ref 0.7–3.1)
LYMPHOCYTES NFR BLD AUTO: 40.5 % (ref 19.6–45.3)
MCH RBC QN AUTO: 31 PG (ref 26.6–33)
MCHC RBC AUTO-ENTMCNC: 32.9 G/DL (ref 31.5–35.7)
MCV RBC AUTO: 94 FL (ref 79–97)
MONOCYTES # BLD AUTO: 0.53 10*3/MM3 (ref 0.1–0.9)
MONOCYTES NFR BLD AUTO: 8.6 % (ref 5–12)
NEUTROPHILS # BLD AUTO: 2.92 10*3/MM3 (ref 1.7–7)
NEUTROPHILS NFR BLD AUTO: 47.5 % (ref 42.7–76)
NRBC BLD AUTO-RTO: 0 /100 WBC (ref 0–0.2)
PLATELET # BLD AUTO: 182 10*3/MM3 (ref 140–450)
PMV BLD AUTO: 11.1 FL (ref 6–12)
POTASSIUM BLD-SCNC: 3.2 MMOL/L (ref 3.5–5.2)
PROT SERPL-MCNC: 6.5 G/DL (ref 6–8.5)
RBC # BLD AUTO: 4.52 10*6/MM3 (ref 4.14–5.8)
SODIUM BLD-SCNC: 137 MMOL/L (ref 136–145)
TROPONIN T SERPL-MCNC: <0.01 NG/ML (ref 0–0.03)
WBC NRBC COR # BLD: 6.15 10*3/MM3 (ref 3.4–10.8)

## 2019-04-22 PROCEDURE — 73502 X-RAY EXAM HIP UNI 2-3 VIEWS: CPT

## 2019-04-22 PROCEDURE — 71046 X-RAY EXAM CHEST 2 VIEWS: CPT

## 2019-04-22 PROCEDURE — 93005 ELECTROCARDIOGRAM TRACING: CPT | Performed by: EMERGENCY MEDICINE

## 2019-04-22 PROCEDURE — 99284 EMERGENCY DEPT VISIT MOD MDM: CPT

## 2019-04-22 PROCEDURE — 93010 ELECTROCARDIOGRAM REPORT: CPT | Performed by: INTERNAL MEDICINE

## 2019-04-22 PROCEDURE — 84484 ASSAY OF TROPONIN QUANT: CPT | Performed by: EMERGENCY MEDICINE

## 2019-04-22 PROCEDURE — 85025 COMPLETE CBC W/AUTO DIFF WBC: CPT | Performed by: EMERGENCY MEDICINE

## 2019-04-22 PROCEDURE — 80053 COMPREHEN METABOLIC PANEL: CPT | Performed by: EMERGENCY MEDICINE

## 2019-04-22 RX ORDER — POTASSIUM CHLORIDE 750 MG/1
40 CAPSULE, EXTENDED RELEASE ORAL ONCE
Status: COMPLETED | OUTPATIENT
Start: 2019-04-22 | End: 2019-04-22

## 2019-04-22 RX ADMIN — SODIUM CHLORIDE 1000 ML: 9 INJECTION, SOLUTION INTRAVENOUS at 21:05

## 2019-04-22 RX ADMIN — POTASSIUM CHLORIDE 40 MEQ: 750 CAPSULE, EXTENDED RELEASE ORAL at 22:55

## 2019-06-10 DIAGNOSIS — E78.2 MIXED HYPERLIPIDEMIA: Primary | ICD-10-CM

## 2019-06-10 DIAGNOSIS — I10 BENIGN HYPERTENSION: ICD-10-CM

## 2019-06-10 DIAGNOSIS — R73.03 BORDERLINE DIABETES MELLITUS: ICD-10-CM

## 2019-06-10 DIAGNOSIS — Z51.81 MEDICATION MONITORING ENCOUNTER: ICD-10-CM

## 2019-06-12 LAB
ALT SERPL-CCNC: 34 U/L (ref 1–41)
APPEARANCE UR: CLEAR
BILIRUB UR QL STRIP: NEGATIVE
BUN SERPL-MCNC: 17 MG/DL (ref 8–23)
BUN/CREAT SERPL: 14.5 (ref 7–25)
CALCIUM SERPL-MCNC: 9.7 MG/DL (ref 8.6–10.5)
CHLORIDE SERPL-SCNC: 98 MMOL/L (ref 98–107)
CHOLEST SERPL-MCNC: 195 MG/DL (ref 0–200)
CO2 SERPL-SCNC: 29.9 MMOL/L (ref 22–29)
COLOR UR: YELLOW
CREAT SERPL-MCNC: 1.17 MG/DL (ref 0.76–1.27)
GLUCOSE SERPL-MCNC: 119 MG/DL (ref 65–99)
GLUCOSE UR QL: NEGATIVE
HBA1C MFR BLD: 6.6 % (ref 4.8–5.6)
HDLC SERPL-MCNC: 30 MG/DL (ref 40–60)
HGB UR QL STRIP: NEGATIVE
KETONES UR QL STRIP: NEGATIVE
LDLC SERPL CALC-MCNC: ABNORMAL MG/DL
LEUKOCYTE ESTERASE UR QL STRIP: NEGATIVE
NITRITE UR QL STRIP: NEGATIVE
PH UR STRIP: 6 [PH] (ref 5–8)
POTASSIUM SERPL-SCNC: 3.9 MMOL/L (ref 3.5–5.2)
PROT UR QL STRIP: NEGATIVE
SODIUM SERPL-SCNC: 139 MMOL/L (ref 136–145)
SP GR UR: 1.02 (ref 1–1.03)
TRIGL SERPL-MCNC: 463 MG/DL (ref 0–150)
TSH SERPL DL<=0.005 MIU/L-ACNC: 3.04 MIU/ML (ref 0.27–4.2)
UROBILINOGEN UR STRIP-MCNC: NORMAL MG/DL
VLDLC SERPL CALC-MCNC: ABNORMAL MG/DL

## 2019-06-18 ENCOUNTER — OFFICE VISIT (OUTPATIENT)
Dept: FAMILY MEDICINE CLINIC | Facility: CLINIC | Age: 69
End: 2019-06-18

## 2019-06-18 VITALS
WEIGHT: 193.4 LBS | HEIGHT: 72 IN | SYSTOLIC BLOOD PRESSURE: 112 MMHG | HEART RATE: 84 BPM | DIASTOLIC BLOOD PRESSURE: 64 MMHG | BODY MASS INDEX: 26.19 KG/M2 | OXYGEN SATURATION: 95 %

## 2019-06-18 DIAGNOSIS — I10 ESSENTIAL HYPERTENSION: Primary | ICD-10-CM

## 2019-06-18 PROCEDURE — 99213 OFFICE O/P EST LOW 20 MIN: CPT | Performed by: FAMILY MEDICINE

## 2019-06-18 RX ORDER — LISINOPRIL 20 MG/1
20 TABLET ORAL DAILY
Qty: 90 TABLET | Refills: 1 | Status: SHIPPED | OUTPATIENT
Start: 2019-06-18 | End: 2020-01-03 | Stop reason: SDUPTHER

## 2019-06-18 NOTE — PROGRESS NOTES
Chief Complaint   Patient presents with   • Hypertension       Subjective     Patient here for follow-up of elevated blood pressure.    He is exercising and is adherent to a low-salt diet.    Blood pressure is well controlled at home.   Cardiac symptoms: none.   Patient denies: chest pressure/discomfort.   Cardiovascular risk factors: advanced age (older than 55 for men, 65 for women), dyslipidemia, family history of premature cardiovascular disease, hypertension, male gender and smoking/ tobacco exposure.   Use of agents associated with hypertension: none.   History of target organ damage: none.  Patient is taking prescribed hypertension medications as prescribed without side effects.    He passed out 2 months ago at home due to too much ETOH and vape THC    Since then he has slowed down and drinking more water      The following portions of the patient's history were reviewed and updated as appropriate: allergies, current medications, past family history, past medical history, past social history, past surgical history and problem list.    Review of Systems  Pertinent items are noted in HPI.    Results for orders placed or performed in visit on 06/10/19   Basic metabolic panel   Result Value Ref Range    Glucose 119 (H) 65 - 99 mg/dL    BUN 17 8 - 23 mg/dL    Creatinine 1.17 0.76 - 1.27 mg/dL    eGFR Non African Am 62 >60 mL/min/1.73    eGFR African Am 75 >60 mL/min/1.73    BUN/Creatinine Ratio 14.5 7.0 - 25.0    Sodium 139 136 - 145 mmol/L    Potassium 3.9 3.5 - 5.2 mmol/L    Chloride 98 98 - 107 mmol/L    Total CO2 29.9 (H) 22.0 - 29.0 mmol/L    Calcium 9.7 8.6 - 10.5 mg/dL   Hemoglobin A1c   Result Value Ref Range    Hemoglobin A1C 6.60 (H) 4.80 - 5.60 %   Lipid Panel   Result Value Ref Range    Total Cholesterol 195 0 - 200 mg/dL    Triglycerides 463 (H) 0 - 150 mg/dL    HDL Cholesterol 30 (L) 40 - 60 mg/dL    VLDL Cholesterol CANCELED mg/dL    LDL Cholesterol  CANCELED mg/dL   TSH   Result Value Ref Range  "   TSH 3.040 0.270 - 4.200 mIU/mL   ALT   Result Value Ref Range    ALT (SGPT) 34 1 - 41 U/L   Urinalysis With Microscopic If Indicated (No Culture) - Urine, Clean Catch   Result Value Ref Range    Specific Gravity, UA 1.016 1.005 - 1.030    pH, UA 6.0 5.0 - 8.0    Color, UA Yellow     Appearance, UA Clear Clear    Leukocytes, UA Negative Negative    Protein Negative Negative    Glucose, UA Negative Negative    Ketones Negative Negative    Blood, UA Negative Negative    Bilirubin, UA Negative Negative    Urobilinogen, UA Comment     Nitrite, UA Negative Negative        Vitals:    06/18/19 1402   BP: 112/64   Pulse: 84   SpO2: 95%   Weight: 87.7 kg (193 lb 6.4 oz)   Height: 182.9 cm (72\")     BP Readings from Last 3 Encounters:   06/18/19 112/64   04/22/19 128/84   12/18/18 114/80     Objective      Gen: alert, pleasant.  HEENT: PERRL, EOMI intact, lids ok, ear canals clear, TMs normal, throat clear, nostrils normal  Neck: no bruit, no enlarged thyroid  Lungs: CTA  Heart: RR no murmur  ABD: soft , + BS  Pulses: intact  Mood: stable     Assessment/Plan   Hypertension, normal blood pressure Evidence of target organ damage: none.    Tim was seen today for hypertension.    Diagnoses and all orders for this visit:    Essential hypertension  -     lisinopril (PRINIVIL,ZESTRIL) 20 MG tablet; Take 1 tablet by mouth Daily.    bp is lower with improved lifestyle.    Medication: decrease to 20 mg lisinopril.    There are no Patient Instructions on file for this visit.  Medications Discontinued During This Encounter   Medication Reason   • lisinopril (PRINIVIL,ZESTRIL) 40 MG tablet Reorder        Return in about 6 months (around 12/18/2019) for blood pressure.    Limit salt  Limit alcoholic drinks to 1 a day  Limit caffeine to 1-2 servings a day    Dr. Gagandeep Romero MD  [place], [place]  [facility].  "

## 2019-12-18 DIAGNOSIS — E78.2 MIXED HYPERLIPIDEMIA: Primary | ICD-10-CM

## 2019-12-18 DIAGNOSIS — R73.03 PREDIABETES: ICD-10-CM

## 2019-12-18 DIAGNOSIS — I10 ESSENTIAL HYPERTENSION: ICD-10-CM

## 2019-12-18 DIAGNOSIS — R53.83 FATIGUE, UNSPECIFIED TYPE: ICD-10-CM

## 2019-12-18 DIAGNOSIS — Z12.5 SCREENING FOR PROSTATE CANCER: ICD-10-CM

## 2020-01-02 LAB
ALT SERPL-CCNC: 33 U/L (ref 1–41)
APPEARANCE UR: CLEAR
BILIRUB UR QL STRIP: NEGATIVE
BUN SERPL-MCNC: 21 MG/DL (ref 8–23)
BUN/CREAT SERPL: 18.4 (ref 7–25)
CALCIUM SERPL-MCNC: 9.4 MG/DL (ref 8.6–10.5)
CHLORIDE SERPL-SCNC: 96 MMOL/L (ref 98–107)
CHOLEST SERPL-MCNC: 213 MG/DL (ref 0–200)
CO2 SERPL-SCNC: 28.7 MMOL/L (ref 22–29)
COLOR UR: YELLOW
CREAT SERPL-MCNC: 1.14 MG/DL (ref 0.76–1.27)
ERYTHROCYTE [DISTWIDTH] IN BLOOD BY AUTOMATED COUNT: 12.3 % (ref 12.3–15.4)
GLUCOSE SERPL-MCNC: 127 MG/DL (ref 65–99)
GLUCOSE UR QL: NEGATIVE
HBA1C MFR BLD: 6.5 % (ref 4.8–5.6)
HCT VFR BLD AUTO: 46.4 % (ref 37.5–51)
HDLC SERPL-MCNC: 33 MG/DL (ref 40–60)
HGB BLD-MCNC: 16.3 G/DL (ref 13–17.7)
HGB UR QL STRIP: NEGATIVE
KETONES UR QL STRIP: NEGATIVE
LDLC SERPL CALC-MCNC: ABNORMAL MG/DL
LEUKOCYTE ESTERASE UR QL STRIP: NEGATIVE
MCH RBC QN AUTO: 32.3 PG (ref 26.6–33)
MCHC RBC AUTO-ENTMCNC: 35.1 G/DL (ref 31.5–35.7)
MCV RBC AUTO: 91.9 FL (ref 79–97)
NITRITE UR QL STRIP: NEGATIVE
PH UR STRIP: 6.5 [PH] (ref 5–8)
PLATELET # BLD AUTO: 202 10*3/MM3 (ref 140–450)
POTASSIUM SERPL-SCNC: 3.9 MMOL/L (ref 3.5–5.2)
PROT UR QL STRIP: NEGATIVE
PSA SERPL-MCNC: 0.72 NG/ML (ref 0–4)
RBC # BLD AUTO: 5.05 10*6/MM3 (ref 4.14–5.8)
SODIUM SERPL-SCNC: 139 MMOL/L (ref 136–145)
SP GR UR: 1.02 (ref 1–1.03)
TRIGL SERPL-MCNC: 420 MG/DL (ref 0–150)
TSH SERPL DL<=0.005 MIU/L-ACNC: 3.76 UIU/ML (ref 0.27–4.2)
UROBILINOGEN UR STRIP-MCNC: NORMAL MG/DL
VLDLC SERPL CALC-MCNC: ABNORMAL MG/DL
WBC # BLD AUTO: 6.78 10*3/MM3 (ref 3.4–10.8)

## 2020-01-03 ENCOUNTER — OFFICE VISIT (OUTPATIENT)
Dept: FAMILY MEDICINE CLINIC | Facility: CLINIC | Age: 70
End: 2020-01-03

## 2020-01-03 VITALS
BODY MASS INDEX: 25.73 KG/M2 | SYSTOLIC BLOOD PRESSURE: 122 MMHG | OXYGEN SATURATION: 98 % | WEIGHT: 190 LBS | HEART RATE: 81 BPM | HEIGHT: 72 IN | DIASTOLIC BLOOD PRESSURE: 88 MMHG

## 2020-01-03 DIAGNOSIS — E78.2 MIXED HYPERLIPIDEMIA: ICD-10-CM

## 2020-01-03 DIAGNOSIS — Z51.81 MEDICATION MONITORING ENCOUNTER: ICD-10-CM

## 2020-01-03 DIAGNOSIS — I10 ESSENTIAL HYPERTENSION: Primary | ICD-10-CM

## 2020-01-03 DIAGNOSIS — E78.1 HYPERTRIGLYCERIDEMIA: ICD-10-CM

## 2020-01-03 DIAGNOSIS — R73.03 PREDIABETES: ICD-10-CM

## 2020-01-03 PROCEDURE — 99213 OFFICE O/P EST LOW 20 MIN: CPT | Performed by: FAMILY MEDICINE

## 2020-01-03 RX ORDER — PRAVASTATIN SODIUM 80 MG/1
80 TABLET ORAL DAILY
Qty: 90 TABLET | Refills: 3 | Status: SHIPPED | OUTPATIENT
Start: 2020-01-03 | End: 2021-01-18 | Stop reason: SDUPTHER

## 2020-01-03 RX ORDER — LISINOPRIL 20 MG/1
20 TABLET ORAL DAILY
Qty: 90 TABLET | Refills: 1 | Status: SHIPPED | OUTPATIENT
Start: 2020-01-03 | End: 2020-07-13 | Stop reason: SDUPTHER

## 2020-01-03 RX ORDER — HYDROCHLOROTHIAZIDE 25 MG/1
25 TABLET ORAL DAILY
Qty: 90 TABLET | Refills: 3 | Status: SHIPPED | OUTPATIENT
Start: 2020-01-03 | End: 2021-01-18 | Stop reason: SDUPTHER

## 2020-01-03 NOTE — PROGRESS NOTES
Chief Complaint   Patient presents with   • Hypertension       Subjective     Patient here for follow-up of elevated blood pressure.    He is not exercising and is adherent to a low-salt diet.    Blood pressure is well controlled at home.   Cardiac symptoms: cough and dyspnea.   Patient denies: chest pain.   Cardiovascular risk factors: advanced age (older than 55 for men, 65 for women), dyslipidemia, family history of premature cardiovascular disease, hypertension, male gender and smoking/ tobacco exposure.   Use of agents associated with hypertension: none.   History of target organ damage: none.  Patient is taking prescribed hypertension medications as prescribed without side effects.    The following portions of the patient's history were reviewed and updated as appropriate: allergies, current medications, past family history, past medical history, past social history, past surgical history and problem list.    Review of Systems  Pertinent items are noted in HPI.    Results for orders placed or performed in visit on 12/18/19   Lipid Panel   Result Value Ref Range    Total Cholesterol 213 (H) 0 - 200 mg/dL    Triglycerides 420 (H) 0 - 150 mg/dL    HDL Cholesterol 33 (L) 40 - 60 mg/dL    VLDL Cholesterol CANCELED mg/dL    LDL Cholesterol  CANCELED mg/dL   CBC (No Diff)   Result Value Ref Range    WBC 6.78 3.40 - 10.80 10*3/mm3    RBC 5.05 4.14 - 5.80 10*6/mm3    Hemoglobin 16.3 13.0 - 17.7 g/dL    Hematocrit 46.4 37.5 - 51.0 %    MCV 91.9 79.0 - 97.0 fL    MCH 32.3 26.6 - 33.0 pg    MCHC 35.1 31.5 - 35.7 g/dL    RDW 12.3 12.3 - 15.4 %    Platelets 202 140 - 450 10*3/mm3   Basic Metabolic Panel   Result Value Ref Range    Glucose 127 (H) 65 - 99 mg/dL    BUN 21 8 - 23 mg/dL    Creatinine 1.14 0.76 - 1.27 mg/dL    eGFR Non African Am 64 >60 mL/min/1.73    eGFR African Am 77 >60 mL/min/1.73    BUN/Creatinine Ratio 18.4 7.0 - 25.0    Sodium 139 136 - 145 mmol/L    Potassium 3.9 3.5 - 5.2 mmol/L    Chloride 96 (L) 98  "- 107 mmol/L    Total CO2 28.7 22.0 - 29.0 mmol/L    Calcium 9.4 8.6 - 10.5 mg/dL   ALT   Result Value Ref Range    ALT (SGPT) 33 1 - 41 U/L   TSH   Result Value Ref Range    TSH 3.760 0.270 - 4.200 uIU/mL   PSA Screen   Result Value Ref Range    PSA 0.720 0.000 - 4.000 ng/mL   Urinalysis With Microscopic If Indicated (No Culture) - Urine, Clean Catch   Result Value Ref Range    Specific Gravity, UA 1.020 1.005 - 1.030    pH, UA 6.5 5.0 - 8.0    Color, UA Yellow     Appearance, UA Clear Clear    Leukocytes, UA Negative Negative    Protein Negative Negative    Glucose, UA Negative Negative    Ketones Negative Negative    Blood, UA Negative Negative    Bilirubin, UA Negative Negative    Urobilinogen, UA Comment     Nitrite, UA Negative Negative   Hemoglobin A1c   Result Value Ref Range    Hemoglobin A1C 6.50 (H) 4.80 - 5.60 %        Vitals:    01/03/20 1323   BP: 122/88   BP Location: Left arm   Patient Position: Sitting   Cuff Size: Adult   Pulse: 81   SpO2: 98%   Weight: 86.2 kg (190 lb)   Height: 182.9 cm (72\")     BP Readings from Last 3 Encounters:   01/03/20 122/88   06/18/19 112/64   04/22/19 128/84     Objective      Gen: alert, pleasant.  HEENT: PERRL, EOMI intact, lids ok, ear canals clear, TMs normal, throat clear, nostrils normal  Neck: no bruit, no enlarged thyroid  Lungs: CTA  Heart: RR no murmur  ABD: soft , + BS  Pulses: intact  Mood: stable     Assessment/Plan   Hypertension, normal blood pressure Evidence of target organ damage: none.    Tim was seen today for hypertension.    Diagnoses and all orders for this visit:    Essential hypertension  -     hydroCHLOROthiazide (HYDRODIURIL) 25 MG tablet; Take 1 tablet by mouth Daily. Indications: High Blood Pressure Disorder  -     lisinopril (PRINIVIL,ZESTRIL) 20 MG tablet; Take 1 tablet by mouth Daily. Indications: High Blood Pressure Disorder  -     Lipid Panel; Future  -     ALT; Future  -     CK; Future  -     Basic Metabolic Panel; Future  -     " Hemoglobin A1c; Future    Mixed hyperlipidemia  -     pravastatin (PRAVACHOL) 80 MG tablet; Take 1 tablet by mouth Daily.  -     Lipid Panel; Future    Hypertriglyceridemia  -     Lipid Panel; Future    Prediabetes  -     Hemoglobin A1c; Future    Medication monitoring encounter  -     ALT; Future  -     CK; Future    still smoking heavy    Will have C scope this yr.    Had pneumonia and flu vaccines at pharm     Lets double statin    Will discuss Ct of chest ; long time smoker.    Medication: no change.  Follow up: 6 months and as needed.    There are no Patient Instructions on file for this visit.  Medications Discontinued During This Encounter   Medication Reason   • hydrochlorothiazide (HYDRODIURIL) 25 MG tablet Reorder   • lisinopril (PRINIVIL,ZESTRIL) 20 MG tablet Reorder   • pravastatin (PRAVACHOL) 40 MG tablet Reorder        Return in about 6 months (around 7/3/2020) for blood pressure, Medicare Wellness visit.    Limit salt  Limit alcoholic drinks to 1 a day  Limit caffeine to 1-2 servings a day    Dr. Gagandeep Romero MD  [place], [place]  [facility].

## 2020-02-10 ENCOUNTER — OUTSIDE FACILITY SERVICE (OUTPATIENT)
Dept: SURGERY | Facility: CLINIC | Age: 70
End: 2020-02-10

## 2020-02-10 PROCEDURE — G0105 COLORECTAL SCRN; HI RISK IND: HCPCS | Performed by: SURGERY

## 2020-06-03 ENCOUNTER — RESULTS ENCOUNTER (OUTPATIENT)
Dept: FAMILY MEDICINE CLINIC | Facility: CLINIC | Age: 70
End: 2020-06-03

## 2020-06-03 DIAGNOSIS — I10 ESSENTIAL HYPERTENSION: ICD-10-CM

## 2020-06-03 DIAGNOSIS — E78.1 HYPERTRIGLYCERIDEMIA: ICD-10-CM

## 2020-06-03 DIAGNOSIS — R73.03 PREDIABETES: ICD-10-CM

## 2020-06-03 DIAGNOSIS — Z51.81 MEDICATION MONITORING ENCOUNTER: ICD-10-CM

## 2020-06-03 DIAGNOSIS — E78.2 MIXED HYPERLIPIDEMIA: ICD-10-CM

## 2020-07-07 LAB
ALT SERPL-CCNC: 31 U/L (ref 1–41)
BUN SERPL-MCNC: 22 MG/DL (ref 8–23)
BUN/CREAT SERPL: 18.8 (ref 7–25)
CALCIUM SERPL-MCNC: 9.8 MG/DL (ref 8.6–10.5)
CHLORIDE SERPL-SCNC: 102 MMOL/L (ref 98–107)
CHOLEST SERPL-MCNC: 199 MG/DL (ref 0–200)
CK SERPL-CCNC: 186 U/L (ref 20–200)
CO2 SERPL-SCNC: 25.6 MMOL/L (ref 22–29)
CREAT SERPL-MCNC: 1.17 MG/DL (ref 0.76–1.27)
GLUCOSE SERPL-MCNC: 121 MG/DL (ref 65–99)
HBA1C MFR BLD: 6.3 % (ref 4.8–5.6)
HDLC SERPL-MCNC: 41 MG/DL (ref 40–60)
LDLC SERPL CALC-MCNC: 106 MG/DL (ref 0–100)
POTASSIUM SERPL-SCNC: 4.2 MMOL/L (ref 3.5–5.2)
SODIUM SERPL-SCNC: 138 MMOL/L (ref 136–145)
TRIGL SERPL-MCNC: 261 MG/DL (ref 0–150)
VLDLC SERPL CALC-MCNC: 52.2 MG/DL

## 2020-07-13 ENCOUNTER — OFFICE VISIT (OUTPATIENT)
Dept: FAMILY MEDICINE CLINIC | Facility: CLINIC | Age: 70
End: 2020-07-13

## 2020-07-13 VITALS
HEIGHT: 72 IN | BODY MASS INDEX: 25.57 KG/M2 | HEART RATE: 72 BPM | DIASTOLIC BLOOD PRESSURE: 86 MMHG | WEIGHT: 188.8 LBS | OXYGEN SATURATION: 98 % | SYSTOLIC BLOOD PRESSURE: 128 MMHG

## 2020-07-13 DIAGNOSIS — E78.1 HYPERTRIGLYCERIDEMIA: ICD-10-CM

## 2020-07-13 DIAGNOSIS — F17.210 NICOTINE DEPENDENCE, CIGARETTES, UNCOMPLICATED: ICD-10-CM

## 2020-07-13 DIAGNOSIS — Z00.00 MEDICARE ANNUAL WELLNESS VISIT, SUBSEQUENT: Primary | ICD-10-CM

## 2020-07-13 DIAGNOSIS — Z12.2 ENCOUNTER FOR SCREENING FOR LUNG CANCER: ICD-10-CM

## 2020-07-13 DIAGNOSIS — I10 ESSENTIAL HYPERTENSION: ICD-10-CM

## 2020-07-13 PROCEDURE — G0439 PPPS, SUBSEQ VISIT: HCPCS | Performed by: FAMILY MEDICINE

## 2020-07-13 RX ORDER — LISINOPRIL 20 MG/1
20 TABLET ORAL DAILY
Qty: 90 TABLET | Refills: 3 | Status: SHIPPED | OUTPATIENT
Start: 2020-07-13 | End: 2021-07-19

## 2020-07-13 NOTE — PROGRESS NOTES
The ABCs of the Annual Wellness Visit  Subsequent Medicare Wellness Visit    Chief Complaint   Patient presents with   • Medicare Wellness-subsequent   • Hyperlipidemia   • Hypertension       Subjective   History of Present Illness:  Tim Kelly is a 70 y.o. male who presents for a Subsequent Medicare Wellness Visit.    HEALTH RISK ASSESSMENT    Recent Hospitalizations:  No hospitalization(s) within the last year.    Current Medical Providers:  Patient Care Team:  Gagandeep Romero MD as PCP - General (Family Medicine)  Gagandeep Romero MD as PCP - Claims Attributed    Smoking Status:  Social History     Tobacco Use   Smoking Status Current Every Day Smoker   • Packs/day: 1.00   • Years: 50.00   • Pack years: 50.00   • Types: Cigarettes   Smokeless Tobacco Never Used   Tobacco Comment    started as a teenager       Alcohol Consumption:  Social History     Substance and Sexual Activity   Alcohol Use Yes   • Alcohol/week: 14.0 standard drinks   • Types: 14 Glasses of wine per week   • Frequency: 4 or more times a week   • Drinks per session: 1 or 2    Comment: 2 per day       Depression Screen:   PHQ-2/PHQ-9 Depression Screening 7/13/2020   Little interest or pleasure in doing things 0   Feeling down, depressed, or hopeless 0   Trouble falling or staying asleep, or sleeping too much 0   Feeling tired or having little energy 0   Poor appetite or overeating 0   Feeling bad about yourself - or that you are a failure or have let yourself or your family down 0   Trouble concentrating on things, such as reading the newspaper or watching television 0   Moving or speaking so slowly that other people could have noticed. Or the opposite - being so fidgety or restless that you have been moving around a lot more than usual 0   Thoughts that you would be better off dead, or of hurting yourself in some way 0   Total Score 0       Fall Risk Screen:  STEADI Fall Risk Assessment has not been completed.    Health Habits and  Functional and Cognitive Screening:  Functional & Cognitive Status 7/13/2020   Do you have difficulty preparing food and eating? No   Do you have difficulty bathing yourself, getting dressed or grooming yourself? No   Do you have difficulty using the toilet? No   Do you have difficulty moving around from place to place? No   Do you have trouble with steps or getting out of a bed or a chair? No   Current Diet Well Balanced Diet   Dental Exam Up to date   Eye Exam Up to date   Exercise (times per week) 4 times per week   Current Exercise Activities Include Walking   Do you need help using the phone?  No   Are you deaf or do you have serious difficulty hearing?  No   Do you need help with transportation? No   Do you need help shopping? No   Do you need help preparing meals?  No   Do you need help with housework?  No   Do you need help with laundry? No   Do you need help taking your medications? No   Do you need help managing money? No   Do you ever drive or ride in a car without wearing a seat belt? No   Have you felt unusual stress, anger or loneliness in the last month? No   Who do you live with? Spouse   If you need help, do you have trouble finding someone available to you? No   Have you been bothered in the last four weeks by sexual problems? No   Do you have difficulty concentrating, remembering or making decisions? No         Does the patient have evidence of cognitive impairment? No    Asprin use counseling:Does not need ASA (and currently is not on it)    Age-appropriate Screening Schedule:  Refer to the list below for future screening recommendations based on patient's age, sex and/or medical conditions. Orders for these recommended tests are listed in the plan section. The patient has been provided with a written plan.    Health Maintenance   Topic Date Due   • TDAP/TD VACCINES (1 - Tdap) 01/15/1961   • ZOSTER VACCINE (2 of 3) 08/10/2016   • INFLUENZA VACCINE  08/01/2020   • LIPID PANEL  07/06/2021   •  "COLONOSCOPY  02/10/2030          The following portions of the patient's history were reviewed and updated as appropriate: allergies, current medications, past family history, past medical history, past social history, past surgical history and problem list.    Outpatient Medications Prior to Visit   Medication Sig Dispense Refill   • aspirin 81 MG chewable tablet Chew.     • hydroCHLOROthiazide (HYDRODIURIL) 25 MG tablet Take 1 tablet by mouth Daily. Indications: High Blood Pressure Disorder 90 tablet 3   • pravastatin (PRAVACHOL) 80 MG tablet Take 1 tablet by mouth Daily. 90 tablet 3   • lisinopril (PRINIVIL,ZESTRIL) 20 MG tablet Take 1 tablet by mouth Daily. Indications: High Blood Pressure Disorder 90 tablet 1     No facility-administered medications prior to visit.        Patient Active Problem List   Diagnosis   • Mixed hyperlipidemia   • Essential hypertension   • Routine adult health maintenance   • Screening for prostate cancer   • Current smoker   • Medication monitoring encounter   • Elevated serum creatinine   • Medicare annual wellness visit, subsequent   • Prediabetes   • Elevated ALT measurement   • Hypertriglyceridemia       Advanced Care Planning:  ACP discussion was held with the patient during this visit. Patient has an advance directive (not in EMR), copy requested.    Review of Systems    Compared to one year ago, the patient feels his physical health is the same.  Compared to one year ago, the patient feels his mental health is the same.    Reviewed chart for potential of high risk medication in the elderly: yes  Reviewed chart for potential of harmful drug interactions in the elderly:yes    Objective         Vitals:    07/13/20 1316   BP: 128/86   BP Location: Left arm   Patient Position: Sitting   Cuff Size: Adult   Pulse: 72   SpO2: 98%   Weight: 85.6 kg (188 lb 12.8 oz)   Height: 182.9 cm (72\")       Body mass index is 25.61 kg/m².  Discussed the patient's BMI with him. The BMI is in the " acceptable range.    Physical Exam   Constitutional: He appears well-developed and well-nourished.   Cardiovascular: Normal rate.   Pulmonary/Chest: Effort normal.   Neurological: He is alert.   Psychiatric: He has a normal mood and affect.   Nursing note and vitals reviewed.      Lab Results   Component Value Date     (H) 07/06/2020    CHLPL 199 07/06/2020    TRIG 261 (H) 07/06/2020    HDL 41 07/06/2020     (H) 07/06/2020    VLDL 52.2 07/06/2020    HGBA1C 6.30 (H) 07/06/2020        Assessment/Plan   Medicare Risks and Personalized Health Plan  CMS Preventative Services Quick Reference  Cardiovascular risk  Colon Cancer Screening  Dementia/Memory   Depression/Dysphoria  Diabetic Lab Screening   Lung Cancer Risk  Prostate Cancer Screening     The above risks/problems have been discussed with the patient.  Pertinent information has been shared with the patient in the After Visit Summary.  Follow up plans and orders are seen below in the Assessment/Plan Section.    Diagnoses and all orders for this visit:    1. Medicare annual wellness visit, subsequent (Primary)    2. Essential hypertension  -     lisinopril (PRINIVIL,ZESTRIL) 20 MG tablet; Take 1 tablet by mouth Daily. Indications: High Blood Pressure Disorder  Dispense: 90 tablet; Refill: 3    3. Hypertriglyceridemia    4. Encounter for screening for lung cancer  -     CT chest low dose wo; Future    5. Nicotine dependence, cigarettes, uncomplicated  -     CT chest low dose wo; Future    labs ok    Saw on skin lesion, mid upper back, is darker than others.  I circled it and have his wife watch for changes on the outside chance of a early melanoma, will recheck the area in 6 months  Last visit with doubled his statin, and lipid panel has improved  We also decreased his lisinopril, and bp is ok      Follow Up:  Return in about 6 months (around 1/13/2021).     An After Visit Summary and PPPS were given to the patient.              Low-Dose Lung Cancer CT  "Screening Visit    CHIEF COMPLAINT:    Shared Decision Making  I am discussing tobacco cessation today with Tim Kelly    SMOKING HISTORY:     Social History     Tobacco Use   Smoking Status Current Every Day Smoker   • Packs/day: 1.00   • Years: 50.00   • Pack years: 50.00   • Types: Cigarettes   Smokeless Tobacco Never Used   Tobacco Comment    started as a teenager       SUBJECTIVE:     Tim Kelly is currently smoking 1 pack(s) per day with a  50 pack year history.  Reports no use of alternate forms of tobacco, electronic cigarettes, marijuana or other substances.  Based on the recommendation of the [place] Preventive Services Task Force, this patient is at high risk for lung cancer and a low-dose CT screening scan is recommended.     The patient has had no hemoptysis, unintentional weight loss or increasing shortness of breath. The patient is asymptomatic and has no signs or symptoms of lung cancer.     Together we discussed the potential benefits and potential harms of being screened for lung cancer including the potential for follow up diagnostic testing, risk for over diagnosis, false positive rate and radiation exposure using the AHR: Is Lung Cancer Screening Right for Me Decision Aid (Publication 66-[id number], web site www.ahrq.gov). A copy of this decision aid resource has been provided in the after visit summary.  We also reviewed the patient's smoking history and counseled him on the importance and health benefits of stopping the use of tobacco products.      OBJECTIVE:    /86 (BP Location: Left arm, Patient Position: Sitting, Cuff Size: Adult)   Pulse 72   Ht 182.9 cm (72\")   Wt 85.6 kg (188 lb 12.8 oz)   SpO2 98%   BMI 25.61 kg/m²   General: no distress, alert and oriented  Chest: Lung sounds are clear to auscultation, no wheezes, rales or rhonchi, with symmetric air entry. No respiratory distress  Cardiovascular: RRR with no murmur auscultated      Smoking Cessation " discussion:     We discussed that there are a number of resources and interventions to assist with smoking cessation if needed in the future including the 1-800-Quit Now line.(Included in the decision aid shared with the patient today).   On a scale of zero to ten, the patient rates their motivation to quit at a 2 out of 10 today.  Referral to stop smoking class has been offered. Medication options for tobacco cessation have been discussed with the patient.     Recommendations for continued lung cancer screening:      We discussed the NCCN guidelines for lung cancer screening and the patient verbalized understanding that annual screening is recommended until fifteen years beyond smoking as long as they have no other disease or comorbidity that would prevent them from receiving cancer treatments such as surgery should a lung cancer be detected.  After review of the NCCN guidelines and recommendations for ongoing screening, the patient verbalized understanding of recommendations for follow-up.  The patient has decided to proceed with a Low Dose Lung Cancer Screening CT today.      3 minutes face-to-face spent counseling patient on the continued health benefits of having quit tobacco, maintaining smoking abstinence, smoking cessation strategies and resources, as well as the importance of adherence to annual lung cancer low-dose CT screening.

## 2020-08-06 ENCOUNTER — HOSPITAL ENCOUNTER (OUTPATIENT)
Dept: CT IMAGING | Facility: HOSPITAL | Age: 70
Discharge: HOME | End: 2020-08-06
Admitting: FAMILY MEDICINE

## 2020-08-06 DIAGNOSIS — Z12.2 ENCOUNTER FOR SCREENING FOR LUNG CANCER: ICD-10-CM

## 2020-08-06 DIAGNOSIS — F17.210 NICOTINE DEPENDENCE, CIGARETTES, UNCOMPLICATED: ICD-10-CM

## 2020-08-06 PROCEDURE — G0297 LDCT FOR LUNG CA SCREEN: HCPCS

## 2020-11-30 ENCOUNTER — OFFICE VISIT (OUTPATIENT)
Dept: FAMILY MEDICINE CLINIC | Facility: CLINIC | Age: 70
End: 2020-11-30

## 2020-11-30 VITALS
WEIGHT: 190 LBS | BODY MASS INDEX: 25.73 KG/M2 | SYSTOLIC BLOOD PRESSURE: 120 MMHG | HEIGHT: 72 IN | HEART RATE: 75 BPM | OXYGEN SATURATION: 99 % | DIASTOLIC BLOOD PRESSURE: 82 MMHG

## 2020-11-30 DIAGNOSIS — R55 PRE-SYNCOPE: Primary | ICD-10-CM

## 2020-11-30 PROCEDURE — 99213 OFFICE O/P EST LOW 20 MIN: CPT | Performed by: FAMILY MEDICINE

## 2020-11-30 NOTE — PROGRESS NOTES
Subjective   Tim Kelly is a 70 y.o. male who is here for   Chief Complaint   Patient presents with   • Loss of Consciousness     thursday last week , was seen at ER in [facility]    .     History of Present Illness   Syncope: Patient complains of near syncope. Onset was 4 days ago, with resolved course since that time. Patient describes the episode as never actually lost consciousness, had precursor symptoms only, including diaphoresis, feeling of almost losing consciousness, greyed out vision, nausea, severe lightheadedness. Patient also has associated symptoms of  none. The patient denies abdominal pain, history of CAD and visual aura. Taking culprit meds?: diuretics, hypotensive medications   On thanksgiving went to GF's family home  Drank way too much. Did not eat  Passed out. ETOH level >100  Went to [facility] ER  Seen and released  Labs reviewed.  Papers scanned in  Hung over on Friday  Sat, sun and today back to normal          The following portions of the patient's history were reviewed and updated as appropriate: allergies, current medications, past family history, past medical history, past social history, past surgical history and problem list.    Review of Systems    Objective   Physical Exam  Cardiovascular:      Rate and Rhythm: Normal rate.   Pulmonary:      Effort: Pulmonary effort is normal.   Neurological:      General: No focal deficit present.      Mental Status: He is alert.   Psychiatric:         Mood and Affect: Mood normal.         Assessment/Plan   Diagnoses and all orders for this visit:    1. Pre-syncope (Primary)    due to 2 bp meds and too much ETOH.    Suggest space ETOH drinks with a glass of water and salty snack  May hold HCTZ on days where we might be dehydrated (sick, fever, diarrhea, too much ETOH, sweating caren...)  There are no Patient Instructions on file for this visit.    There are no discontinued medications.     No follow-ups on file.    Dr. Donis  Nick  [place], [place]

## 2020-12-18 ENCOUNTER — OFFICE VISIT (OUTPATIENT)
Dept: FAMILY MEDICINE CLINIC | Facility: CLINIC | Age: 70
End: 2020-12-18

## 2020-12-18 VITALS
SYSTOLIC BLOOD PRESSURE: 120 MMHG | OXYGEN SATURATION: 98 % | HEART RATE: 89 BPM | DIASTOLIC BLOOD PRESSURE: 84 MMHG | BODY MASS INDEX: 25.6 KG/M2 | HEIGHT: 72 IN | WEIGHT: 189 LBS

## 2020-12-18 DIAGNOSIS — M70.21 OLECRANON BURSITIS OF RIGHT ELBOW: Primary | ICD-10-CM

## 2020-12-18 PROCEDURE — 99213 OFFICE O/P EST LOW 20 MIN: CPT | Performed by: FAMILY MEDICINE

## 2020-12-18 NOTE — PROGRESS NOTES
Subjective   Tim Kelly is a 70 y.o. male who is here for   Chief Complaint   Patient presents with   • Elbow Pain     rt elbow 2 days ago, fluid built up    .     History of Present Illness     Visit today with Tim Kelly.  He has had right elbow olecranon process pain for 1 to 2 months.  No specific injury.  Left is unaffected.  Then he noticed the right elbow has been swollen the last couple days.  Ask the pain is gone away is not red is not hot itchy or swollen.      The following portions of the patient's history were reviewed and updated as appropriate: allergies, current medications, past family history, past medical history, past social history, past surgical history and problem list.    Review of Systems    Objective   Physical Exam   On exam he has an obvious inflamed right olecranon bursitis.  It is not red is not tender.  Bursa sacs probably grape sized  Really not big enough to attempt an aspiration today    Assessment/Plan   Diagnoses and all orders for this visit:    1. Olecranon bursitis of right elbow (Primary)    Rest the elbow for a few weeks ice pack twice a day if he can.  Hopefully to go away on its own.  If it gets worse as an more swelling larger red hot he can schedule appointment and will attempt an aspiration  There are no Patient Instructions on file for this visit.    There are no discontinued medications.     No follow-ups on file.    Dr. Gagandeep Romero  [place], [place]

## 2021-01-18 ENCOUNTER — OFFICE VISIT (OUTPATIENT)
Dept: FAMILY MEDICINE CLINIC | Facility: CLINIC | Age: 71
End: 2021-01-18

## 2021-01-18 VITALS
WEIGHT: 188 LBS | SYSTOLIC BLOOD PRESSURE: 130 MMHG | DIASTOLIC BLOOD PRESSURE: 80 MMHG | OXYGEN SATURATION: 99 % | HEART RATE: 77 BPM | BODY MASS INDEX: 25.47 KG/M2 | HEIGHT: 72 IN

## 2021-01-18 DIAGNOSIS — E78.2 MIXED HYPERLIPIDEMIA: ICD-10-CM

## 2021-01-18 DIAGNOSIS — I10 ESSENTIAL HYPERTENSION: ICD-10-CM

## 2021-01-18 PROCEDURE — 99213 OFFICE O/P EST LOW 20 MIN: CPT | Performed by: FAMILY MEDICINE

## 2021-01-18 RX ORDER — PRAVASTATIN SODIUM 80 MG/1
80 TABLET ORAL DAILY
Qty: 90 TABLET | Refills: 3 | Status: SHIPPED | OUTPATIENT
Start: 2021-01-18 | End: 2022-02-01 | Stop reason: SDUPTHER

## 2021-01-18 RX ORDER — HYDROCHLOROTHIAZIDE 25 MG/1
25 TABLET ORAL DAILY
Qty: 90 TABLET | Refills: 3 | Status: SHIPPED | OUTPATIENT
Start: 2021-01-18 | End: 2022-01-14

## 2021-01-18 NOTE — PROGRESS NOTES
"  Chief Complaint   Patient presents with   • Hypertension       Subjective     Patient here for follow-up of elevated blood pressure.    He is exercising and is adherent to a low-salt diet.    Blood pressure is well controlled at home.   Cardiac symptoms: cough.   Patient denies: chest pain.   Cardiovascular risk factors: advanced age (older than 55 for men, 65 for women), dyslipidemia, family history of premature cardiovascular disease, hypertension, male gender and smoking/ tobacco exposure.   Use of agents associated with hypertension: none.   History of target organ damage: none.  Patient is taking prescribed hypertension medications as prescribed without side effects.    The following portions of the patient's history were reviewed and updated as appropriate: allergies, current medications, past family history, past medical history, past social history, past surgical history and problem list.    Review of Systems  Pertinent items are noted in HPI.         Vitals:    01/18/21 0937   BP: 130/80   BP Location: Left arm   Patient Position: Sitting   Cuff Size: Adult   Pulse: 77   SpO2: 99%   Weight: 85.3 kg (188 lb)   Height: 182.9 cm (72\")     BP Readings from Last 3 Encounters:   01/18/21 130/80   12/18/20 120/84   11/30/20 120/82     Objective      Gen: alert, pleasant.  Neck: no bruit, no enlarged thyroid  Lungs: CTA  Heart: RR, no murmur  Feet: no edema  Pulses: intact    Left elbow, olecranon bursa swelling is bigger  Offered drainage of bursa          Assessment/Plan   Hypertension, normal blood pressure Evidence of target organ damage: none.    Diagnoses and all orders for this visit:    1. Essential hypertension  -     hydroCHLOROthiazide (HYDRODIURIL) 25 MG tablet; Take 1 tablet by mouth Daily. Indications: High Blood Pressure Disorder  Dispense: 90 tablet; Refill: 3    2. Mixed hyperlipidemia  -     pravastatin (PRAVACHOL) 80 MG tablet; Take 1 tablet by mouth Daily.  Dispense: 90 tablet; Refill: " 3      Medication: no change.  Follow up: 6 months and as needed.    There are no Patient Instructions on file for this visit.  Medications Discontinued During This Encounter   Medication Reason   • hydroCHLOROthiazide (HYDRODIURIL) 25 MG tablet Reorder   • pravastatin (PRAVACHOL) 80 MG tablet Reorder        Return in about 6 months (around 7/18/2021) for Medicare Wellness visit.    Limit salt  Limit alcoholic drinks to 1 a day  Limit caffeine to 1-2 servings a day    Dr. Gagandeep Romero MD  [place], [place]  [facility].

## 2021-03-02 DIAGNOSIS — Z23 IMMUNIZATION DUE: ICD-10-CM

## 2021-07-07 ENCOUNTER — OFFICE VISIT (OUTPATIENT)
Dept: FAMILY MEDICINE CLINIC | Facility: CLINIC | Age: 71
End: 2021-07-07

## 2021-07-07 VITALS
WEIGHT: 186 LBS | DIASTOLIC BLOOD PRESSURE: 78 MMHG | OXYGEN SATURATION: 98 % | HEART RATE: 86 BPM | HEIGHT: 72 IN | TEMPERATURE: 97.7 F | SYSTOLIC BLOOD PRESSURE: 126 MMHG | BODY MASS INDEX: 25.19 KG/M2

## 2021-07-07 DIAGNOSIS — R42 VERTIGO: Primary | ICD-10-CM

## 2021-07-07 PROCEDURE — 99213 OFFICE O/P EST LOW 20 MIN: CPT | Performed by: FAMILY MEDICINE

## 2021-07-07 RX ORDER — MECLIZINE HYDROCHLORIDE 25 MG/1
25 TABLET ORAL 3 TIMES DAILY PRN
Qty: 20 TABLET | Refills: 0 | Status: SHIPPED | OUTPATIENT
Start: 2021-07-07 | End: 2021-08-20

## 2021-07-07 NOTE — PROGRESS NOTES
"  Subjective   Tim Kelly is a 71 y.o. male who is here for   Chief Complaint   Patient presents with   • Dizziness     saturday- no episodes since then    .     History of Present Illness   Tim had a spell what sounds like vertigo over the weekend.  It occurred soon as he got out of bed on Saturday morning.  He has sensation the room was spinning losing his balance.  This lasted most of the day.  Likely resolved by the next morning  He has had vertigo before.  He has chronic tinnitus in both ears also has hearing loss in both ears.  No recent infection fever head injury.  Vision is normal no nausea vomiting.  Did not drink alcohol to excess    The following portions of the patient's history were reviewed and updated as appropriate: allergies, current medications, past family history, past medical history, past social history, past surgical history and problem list.    Review of Systems    Objective   Vitals:    07/07/21 1559   BP: 126/78   BP Location: Left arm   Patient Position: Sitting   Cuff Size: Adult   Pulse: 86   Temp: 97.7 °F (36.5 °C)   TempSrc: Temporal   SpO2: 98%   Weight: 84.4 kg (186 lb)   Height: 182.9 cm (72\")      Physical Exam  HENT:      Head: Normocephalic and atraumatic.      Right Ear: Tympanic membrane, ear canal and external ear normal.      Left Ear: Ear canal and external ear normal.      Ears:     Neck:      Vascular: No carotid bruit.   Cardiovascular:      Rate and Rhythm: Normal rate.   Pulmonary:      Effort: Pulmonary effort is normal.   Neurological:      General: No focal deficit present.      Mental Status: He is alert.      Cranial Nerves: No cranial nerve deficit.         Assessment/Plan   Diagnoses and all orders for this visit:    1. Vertigo (Primary)  -     meclizine (ANTIVERT) 25 MG tablet; Take 1 tablet by mouth 3 (Three) Times a Day As Needed for Dizziness.  Dispense: 20 tablet; Refill: 0      There are no Patient Instructions on file for this visit.    There " are no discontinued medications.     No follow-ups on file.    Dr. Gagandeep Romero  [place], [place]

## 2021-07-19 DIAGNOSIS — Z12.5 SCREENING FOR PROSTATE CANCER: ICD-10-CM

## 2021-07-19 DIAGNOSIS — E78.2 MIXED HYPERLIPIDEMIA: Primary | ICD-10-CM

## 2021-07-19 DIAGNOSIS — I10 ESSENTIAL HYPERTENSION: ICD-10-CM

## 2021-07-19 DIAGNOSIS — R73.03 PREDIABETES: ICD-10-CM

## 2021-07-19 DIAGNOSIS — R79.89 ELEVATED SERUM CREATININE: ICD-10-CM

## 2021-07-19 DIAGNOSIS — Z51.81 MEDICATION MONITORING ENCOUNTER: ICD-10-CM

## 2021-07-19 DIAGNOSIS — E78.2 MIXED HYPERLIPIDEMIA: ICD-10-CM

## 2021-07-19 RX ORDER — LISINOPRIL 20 MG/1
TABLET ORAL
Qty: 90 TABLET | Refills: 0 | Status: SHIPPED | OUTPATIENT
Start: 2021-07-19 | End: 2021-08-20

## 2021-07-21 LAB
ALT SERPL-CCNC: 34 U/L (ref 1–41)
APPEARANCE UR: CLEAR
BILIRUB UR QL STRIP: NEGATIVE
BUN SERPL-MCNC: 20 MG/DL (ref 8–23)
BUN/CREAT SERPL: 17.1 (ref 7–25)
CALCIUM SERPL-MCNC: 9.7 MG/DL (ref 8.6–10.5)
CHLORIDE SERPL-SCNC: 101 MMOL/L (ref 98–107)
CHOLEST SERPL-MCNC: 169 MG/DL (ref 0–200)
CO2 SERPL-SCNC: 25.6 MMOL/L (ref 22–29)
COLOR UR: ABNORMAL
CREAT SERPL-MCNC: 1.17 MG/DL (ref 0.76–1.27)
ERYTHROCYTE [DISTWIDTH] IN BLOOD BY AUTOMATED COUNT: 12.4 % (ref 12.3–15.4)
GLUCOSE SERPL-MCNC: 122 MG/DL (ref 65–99)
GLUCOSE UR QL: NEGATIVE
HBA1C MFR BLD: 6.8 % (ref 4.8–5.6)
HCT VFR BLD AUTO: 48.5 % (ref 37.5–51)
HDLC SERPL-MCNC: 34 MG/DL (ref 40–60)
HGB BLD-MCNC: 16.2 G/DL (ref 13–17.7)
HGB UR QL STRIP: NEGATIVE
KETONES UR QL STRIP: NEGATIVE
LDLC SERPL CALC-MCNC: 91 MG/DL (ref 0–100)
LEUKOCYTE ESTERASE UR QL STRIP: NEGATIVE
MCH RBC QN AUTO: 31.6 PG (ref 26.6–33)
MCHC RBC AUTO-ENTMCNC: 33.4 G/DL (ref 31.5–35.7)
MCV RBC AUTO: 94.5 FL (ref 79–97)
NITRITE UR QL STRIP: NEGATIVE
PH UR STRIP: 6 [PH] (ref 5–8)
PLATELET # BLD AUTO: 191 10*3/MM3 (ref 140–450)
POTASSIUM SERPL-SCNC: 3.9 MMOL/L (ref 3.5–5.2)
PROT UR QL STRIP: NEGATIVE
PSA SERPL-MCNC: 0.33 NG/ML (ref 0–4)
RBC # BLD AUTO: 5.13 10*6/MM3 (ref 4.14–5.8)
SODIUM SERPL-SCNC: 138 MMOL/L (ref 136–145)
SP GR UR: 1.02 (ref 1–1.03)
TRIGL SERPL-MCNC: 262 MG/DL (ref 0–150)
TSH SERPL DL<=0.005 MIU/L-ACNC: 3.77 UIU/ML (ref 0.27–4.2)
UROBILINOGEN UR STRIP-MCNC: ABNORMAL MG/DL
VLDLC SERPL CALC-MCNC: 44 MG/DL (ref 5–40)
WBC # BLD AUTO: 8.22 10*3/MM3 (ref 3.4–10.8)

## 2021-08-03 ENCOUNTER — OFFICE VISIT (OUTPATIENT)
Dept: FAMILY MEDICINE CLINIC | Facility: CLINIC | Age: 71
End: 2021-08-03

## 2021-08-03 VITALS
DIASTOLIC BLOOD PRESSURE: 80 MMHG | HEART RATE: 68 BPM | TEMPERATURE: 97.5 F | WEIGHT: 185 LBS | SYSTOLIC BLOOD PRESSURE: 126 MMHG | BODY MASS INDEX: 25.06 KG/M2 | HEIGHT: 72 IN | OXYGEN SATURATION: 96 %

## 2021-08-03 DIAGNOSIS — R73.03 PREDIABETES: ICD-10-CM

## 2021-08-03 DIAGNOSIS — Z12.2 ENCOUNTER FOR SCREENING FOR LUNG CANCER: ICD-10-CM

## 2021-08-03 DIAGNOSIS — Z87.891 PERSONAL HISTORY OF NICOTINE DEPENDENCE: ICD-10-CM

## 2021-08-03 DIAGNOSIS — Z00.00 MEDICARE ANNUAL WELLNESS VISIT, SUBSEQUENT: Primary | ICD-10-CM

## 2021-08-03 PROCEDURE — G0439 PPPS, SUBSEQ VISIT: HCPCS | Performed by: FAMILY MEDICINE

## 2021-08-03 NOTE — PROGRESS NOTES
The ABCs of the Annual Wellness Visit  Subsequent Medicare Wellness Visit    Chief Complaint   Patient presents with   • Medicare Wellness-subsequent       Subjective   History of Present Illness:  Tim Kelly is a 71 y.o. male who presents for a Subsequent Medicare Wellness Visit.    HEALTH RISK ASSESSMENT    Recent Hospitalizations:  No hospitalization(s) within the last year.    Current Medical Providers:  Patient Care Team:  Gagandeep Romero MD as PCP - General (Family Medicine)    Smoking Status:  Social History     Tobacco Use   Smoking Status Current Every Day Smoker   • Packs/day: 1.00   • Years: 55.00   • Pack years: 55.00   • Types: Cigarettes   Smokeless Tobacco Never Used   Tobacco Comment    started as a teenager       Alcohol Consumption:  Social History     Substance and Sexual Activity   Alcohol Use Yes   • Alcohol/week: 7.0 standard drinks   • Types: 7 Glasses of wine per week    Comment: 1 a day       Depression Screen:   PHQ-2/PHQ-9 Depression Screening 8/3/2021   Little interest or pleasure in doing things 0   Feeling down, depressed, or hopeless 0   Trouble falling or staying asleep, or sleeping too much -   Feeling tired or having little energy -   Poor appetite or overeating -   Feeling bad about yourself - or that you are a failure or have let yourself or your family down -   Trouble concentrating on things, such as reading the newspaper or watching television -   Moving or speaking so slowly that other people could have noticed. Or the opposite - being so fidgety or restless that you have been moving around a lot more than usual -   Thoughts that you would be better off dead, or of hurting yourself in some way -   Total Score 0       Fall Risk Screen:  STEADI Fall Risk Assessment was completed, and patient is at LOW risk for falls.Assessment completed on:1/18/2021    Health Habits and Functional and Cognitive Screening:  Functional & Cognitive Status 8/3/2021   Do you have  difficulty preparing food and eating? No   Do you have difficulty bathing yourself, getting dressed or grooming yourself? No   Do you have difficulty using the toilet? No   Do you have difficulty moving around from place to place? No   Do you have trouble with steps or getting out of a bed or a chair? No   Current Diet Well Balanced Diet   Dental Exam Up to date   Eye Exam Up to date   Exercise (times per week) 2 times per week   Current Exercises Include Walking   Current Exercise Activities Include -   Do you need help using the phone?  No   Are you deaf or do you have serious difficulty hearing?  No   Do you need help with transportation? No   Do you need help shopping? No   Do you need help preparing meals?  No   Do you need help with housework?  No   Do you need help with laundry? No   Do you need help taking your medications? No   Do you need help managing money? No   Do you ever drive or ride in a car without wearing a seat belt? No   Have you felt unusual stress, anger or loneliness in the last month? No   Who do you live with? Spouse   If you need help, do you have trouble finding someone available to you? No   Have you been bothered in the last four weeks by sexual problems? No   Do you have difficulty concentrating, remembering or making decisions? No         Does the patient have evidence of cognitive impairment? No    Asprin use counseling:Taking ASA appropriately as indicated    Age-appropriate Screening Schedule:  Refer to the list below for future screening recommendations based on patient's age, sex and/or medical conditions. Orders for these recommended tests are listed in the plan section. The patient has been provided with a written plan.    Health Maintenance   Topic Date Due   • TDAP/TD VACCINES (1 - Tdap) Never done   • ZOSTER VACCINE (2 of 3) 08/10/2016   • INFLUENZA VACCINE  10/01/2021   • LIPID PANEL  07/20/2022          The following portions of the patient's history were reviewed and  "updated as appropriate: allergies, current medications, past family history, past medical history, past social history, past surgical history and problem list.    Outpatient Medications Prior to Visit   Medication Sig Dispense Refill   • aspirin 81 MG chewable tablet Chew.     • hydroCHLOROthiazide (HYDRODIURIL) 25 MG tablet Take 1 tablet by mouth Daily. Indications: High Blood Pressure Disorder 90 tablet 3   • lisinopril (PRINIVIL,ZESTRIL) 20 MG tablet TAKE ONE TABLET BY MOUTH DAILY 90 tablet 0   • meclizine (ANTIVERT) 25 MG tablet Take 1 tablet by mouth 3 (Three) Times a Day As Needed for Dizziness. 20 tablet 0   • pravastatin (PRAVACHOL) 80 MG tablet Take 1 tablet by mouth Daily. 90 tablet 3   • VITAMIN D PO Take  by mouth.       No facility-administered medications prior to visit.       Patient Active Problem List   Diagnosis   • Mixed hyperlipidemia   • Essential hypertension   • Screening for prostate cancer   • Current smoker   • Medication monitoring encounter   • Elevated serum creatinine   • Medicare annual wellness visit, subsequent   • Prediabetes   • Elevated ALT measurement   • Hypertriglyceridemia   • Olecranon bursitis of right elbow   • Encounter for screening for lung cancer       Advanced Care Planning:  ACP discussion was held with the patient during this visit. Patient has an advance directive (not in EMR), copy requested.    Review of Systems    Compared to one year ago, the patient feels his physical health is better.  Compared to one year ago, the patient feels his mental health is better.    Reviewed chart for potential of high risk medication in the elderly: yes  Reviewed chart for potential of harmful drug interactions in the elderly:yes    Objective         Vitals:    08/03/21 0850   BP: 126/80   BP Location: Left arm   Patient Position: Sitting   Cuff Size: Adult   Pulse: 68   Temp: 97.5 °F (36.4 °C)   TempSrc: Temporal   SpO2: 96%   Weight: 83.9 kg (185 lb)   Height: 182.9 cm (72\") "       Body mass index is 25.09 kg/m².  Discussed the patient's BMI with him. The BMI is in the acceptable range.    Physical Exam  Vitals reviewed.   Cardiovascular:      Rate and Rhythm: Normal rate.   Pulmonary:      Effort: Pulmonary effort is normal.   Neurological:      Mental Status: He is alert.   Psychiatric:         Mood and Affect: Mood normal.         Lab Results   Component Value Date     (H) 07/20/2021    CHLPL 169 07/20/2021    TRIG 262 (H) 07/20/2021    HDL 34 (L) 07/20/2021    LDL 91 07/20/2021    VLDL 44 (H) 07/20/2021    HGBA1C 6.80 (H) 07/20/2021        Assessment/Plan   Medicare Risks and Personalized Health Plan  CMS Preventative Services Quick Reference  Alcohol Misuse  Cardiovascular risk  Colon Cancer Screening  Dementia/Memory   Depression/Dysphoria  Diabetic Lab Screening   Lung Cancer Risk  Prostate Cancer Screening     The above risks/problems have been discussed with the patient.  Pertinent information has been shared with the patient in the After Visit Summary.  Follow up plans and orders are seen below in the Assessment/Plan Section.    Diagnoses and all orders for this visit:    1. Medicare annual wellness visit, subsequent (Primary)    2. Encounter for screening for lung cancer  -     CT Chest Low Dose Wo; Future    3. Personal history of nicotine dependence   -     CT Chest Low Dose Wo; Future    4. Prediabetes  -     Basic Metabolic Panel; Future  -     Hemoglobin A1c; Future    A1c is going up, discussed diabetes    Still smoking,  Has reduced ETOH and has stopped THC use ( I kept passing out)    Follow Up:  Return in about 6 months (around 2/3/2022).     An After Visit Summary and PPPS were given to the patient.

## 2021-08-10 ENCOUNTER — TELEPHONE (OUTPATIENT)
Dept: FAMILY MEDICINE CLINIC | Facility: CLINIC | Age: 71
End: 2021-08-10

## 2021-08-10 NOTE — TELEPHONE ENCOUNTER
Caller: RICARDO PINK     Relationship:     Best call back number: [phone]  What is the best time to reach you: ANYTIME    Who are you requesting to speak with (clinical staff, provider,  specific staff member): CLINICAL    Do you know the name of the person who called: RICARDO PEREZ    What was the call regarding: PATIENTS DAUGHTER WHO IS NOT ON THE  VERBAL CALL AND LEFT THIS MESSAGE:  STATES HIS MEMORY IS DECLINING AND WANTS TO KNOW IF SOME TESTS CAN BE RUN. THEY HAVE TOLD HIM ABOUT IT BUT HE DOES NOT RECOGNIZE THAT HE IS HAVING THIS PROBLEM.     Do you require a callback: YES IF YOU FEEL YOU FEEL YOU WANT TO TALK TO HER. HIS NEXT APPT IS NOT UNTIL FEB SO SHE CONCERNED.

## 2021-08-10 NOTE — TELEPHONE ENCOUNTER
FYI-pts daughter is not on verbal release, we cannot talk to her technically.  But sending message to you since it was taken.

## 2021-08-20 DIAGNOSIS — I10 ESSENTIAL HYPERTENSION: ICD-10-CM

## 2021-08-20 DIAGNOSIS — R42 VERTIGO: ICD-10-CM

## 2021-08-20 RX ORDER — MECLIZINE HYDROCHLORIDE 25 MG/1
TABLET ORAL
Qty: 60 TABLET | Refills: 0 | Status: SHIPPED | OUTPATIENT
Start: 2021-08-20 | End: 2022-02-01 | Stop reason: SDUPTHER

## 2021-08-20 RX ORDER — LISINOPRIL 20 MG/1
TABLET ORAL
Qty: 90 TABLET | Refills: 3 | Status: SHIPPED | OUTPATIENT
Start: 2021-08-20 | End: 2022-08-30 | Stop reason: SDUPTHER

## 2021-09-08 ENCOUNTER — HOSPITAL ENCOUNTER (OUTPATIENT)
Dept: CT IMAGING | Facility: HOSPITAL | Age: 71
Discharge: HOME | End: 2021-09-08
Admitting: FAMILY MEDICINE

## 2021-09-08 DIAGNOSIS — Z87.891 PERSONAL HISTORY OF NICOTINE DEPENDENCE: ICD-10-CM

## 2021-09-08 DIAGNOSIS — Z12.2 ENCOUNTER FOR SCREENING FOR LUNG CANCER: ICD-10-CM

## 2021-09-08 PROCEDURE — 71271 CT THORAX LUNG CANCER SCR C-: CPT

## 2022-01-14 DIAGNOSIS — I10 ESSENTIAL HYPERTENSION: ICD-10-CM

## 2022-01-17 RX ORDER — HYDROCHLOROTHIAZIDE 25 MG/1
TABLET ORAL
Qty: 90 TABLET | Refills: 0 | Status: SHIPPED | OUTPATIENT
Start: 2022-01-17 | End: 2022-04-22 | Stop reason: SDUPTHER

## 2022-02-01 ENCOUNTER — OFFICE VISIT (OUTPATIENT)
Dept: FAMILY MEDICINE CLINIC | Facility: CLINIC | Age: 72
End: 2022-02-01

## 2022-02-01 VITALS
WEIGHT: 187 LBS | SYSTOLIC BLOOD PRESSURE: 130 MMHG | TEMPERATURE: 97.5 F | BODY MASS INDEX: 25.33 KG/M2 | OXYGEN SATURATION: 98 % | DIASTOLIC BLOOD PRESSURE: 90 MMHG | HEART RATE: 81 BPM | HEIGHT: 72 IN

## 2022-02-01 DIAGNOSIS — R41.89 COGNITIVE CHANGE: Primary | ICD-10-CM

## 2022-02-01 DIAGNOSIS — E78.2 MIXED HYPERLIPIDEMIA: ICD-10-CM

## 2022-02-01 DIAGNOSIS — R42 VERTIGO: ICD-10-CM

## 2022-02-01 PROCEDURE — 99214 OFFICE O/P EST MOD 30 MIN: CPT | Performed by: FAMILY MEDICINE

## 2022-02-01 RX ORDER — PRAVASTATIN SODIUM 80 MG/1
80 TABLET ORAL DAILY
Qty: 90 TABLET | Refills: 3 | Status: SHIPPED | OUTPATIENT
Start: 2022-02-01 | End: 2023-02-16

## 2022-02-01 RX ORDER — MECLIZINE HYDROCHLORIDE 25 MG/1
25 TABLET ORAL 3 TIMES DAILY PRN
Qty: 60 TABLET | Refills: 1 | Status: SHIPPED | OUTPATIENT
Start: 2022-02-01 | End: 2022-02-18

## 2022-02-01 NOTE — PROGRESS NOTES
Chief Complaint   Patient presents with   • Blood Sugar Problem   • Hypertension       Subjective     Patient here for follow-up of elevated blood pressure.    He is not exercising and is not adherent to a low-salt diet.    Blood pressure is well controlled at home.   Cardiac symptoms: near-syncope.   Patient denies: chest pain.   Cardiovascular risk factors: advanced age (older than 55 for men, 65 for women), hypertension and male gender.   Use of agents associated with hypertension: none.   History of target organ damage: none.  Patient is taking prescribed hypertension medications as prescribed without side effects.    His daughter comes with him today expressing concern about his cognitive decline.  Becoming more forgetful.  Also concerns about his persistent tinnitus in his ears and vertigo.  He is unsteady on his feet.  No falls.  No dementia in the family.  No recent head injury.    The following portions of the patient's history were reviewed and updated as appropriate: allergies, current medications, past family history, past medical history, past social history, past surgical history and problem list.    Review of Systems  A comprehensive review of systems was negative except for: Neurological: positive for coordination problems, dizziness, gait problems, memory problems, vertigo and weakness    Results for orders placed or performed in visit on 01/03/22   Basic Metabolic Panel    Specimen: Blood   Result Value Ref Range    Glucose 140 (H) 65 - 99 mg/dL    BUN 24 8 - 27 mg/dL    Creatinine 1.26 0.76 - 1.27 mg/dL    eGFR Non African Am 57 (L) >59 mL/min/1.73    eGFR African Am 65 >59 mL/min/1.73    BUN/Creatinine Ratio 19 10 - 24    Sodium 141 134 - 144 mmol/L    Potassium 4.2 3.5 - 5.2 mmol/L    Chloride 100 96 - 106 mmol/L    Total CO2 27 20 - 29 mmol/L    Calcium 9.7 8.6 - 10.2 mg/dL   Hemoglobin A1c    Specimen: Blood   Result Value Ref Range    Hemoglobin A1C 6.7 (H) 4.8 - 5.6 %        Vitals:     "02/01/22 1312   BP: 130/90   Pulse: 81   Temp: 97.5 °F (36.4 °C)   SpO2: 98%   Weight: 84.8 kg (187 lb)   Height: 182.9 cm (72\")     BP Readings from Last 3 Encounters:   02/01/22 130/90   08/03/21 126/80   07/07/21 126/78     Objective      Gen: alert, pleasant.  Neck: no bruit, no enlarged thyroid  Lungs: CTA  Heart: RR, no murmur  Feet: no edema  Pulses: intact       Assessment/Plan   Hypertension, normal blood pressure Evidence of target organ damage: none.    Diagnoses and all orders for this visit:    1. Cognitive change (Primary)  -     MRI Brain Without Contrast; Future    2. Vertigo  -     meclizine (ANTIVERT) 25 MG tablet; Take 1 tablet by mouth 3 (Three) Times a Day As Needed for Dizziness.  Dispense: 60 tablet; Refill: 1  -     MRI Brain Without Contrast; Future  -     Ambulatory Referral to Physical Therapy Vestibular    3. Mixed hyperlipidemia  -     pravastatin (PRAVACHOL) 80 MG tablet; Take 1 tablet by mouth Daily.  Dispense: 90 tablet; Refill: 3      Medication: no change.  Follow up: 2 weeks and as needed.    There are no Patient Instructions on file for this visit.  Medications Discontinued During This Encounter   Medication Reason   • pravastatin (PRAVACHOL) 80 MG tablet Reorder   • meclizine (ANTIVERT) 25 MG tablet Reorder        Return in about 2 weeks (around 2/15/2022).    Limit salt  Limit alcoholic drinks to 1 a day  Limit caffeine to 1-2 servings a day    Dr. Gagandeep Romero MD  [place], [place]  [facility].  "

## 2022-02-16 ENCOUNTER — TELEPHONE (OUTPATIENT)
Dept: FAMILY MEDICINE CLINIC | Facility: CLINIC | Age: 72
End: 2022-02-16

## 2022-02-16 NOTE — TELEPHONE ENCOUNTER
Hub to relay     Left MRI department at [facility] to add the IV contrast.

## 2022-02-16 NOTE — TELEPHONE ENCOUNTER
Gregg nunez wanted to clarify patients MRI order scheduled tomorrow, they said usually with vertigo it is done with and without contrast, do you want to add that?

## 2022-02-17 ENCOUNTER — HOSPITAL ENCOUNTER (OUTPATIENT)
Dept: MRI IMAGING | Facility: HOSPITAL | Age: 72
Discharge: HOME | End: 2022-02-17
Admitting: FAMILY MEDICINE

## 2022-02-17 DIAGNOSIS — R42 VERTIGO: ICD-10-CM

## 2022-02-17 DIAGNOSIS — R41.89 COGNITIVE CHANGE: ICD-10-CM

## 2022-02-17 PROCEDURE — A9577 INJ MULTIHANCE: HCPCS | Performed by: FAMILY MEDICINE

## 2022-02-17 PROCEDURE — 70553 MRI BRAIN STEM W/O & W/DYE: CPT

## 2022-02-17 PROCEDURE — 0 GADOBENATE DIMEGLUMINE 529 MG/ML SOLUTION: Performed by: FAMILY MEDICINE

## 2022-02-17 RX ADMIN — GADOBENATE DIMEGLUMINE 17 ML: 529 INJECTION, SOLUTION INTRAVENOUS at 12:05

## 2022-02-18 ENCOUNTER — OFFICE VISIT (OUTPATIENT)
Dept: FAMILY MEDICINE CLINIC | Facility: CLINIC | Age: 72
End: 2022-02-18

## 2022-02-18 VITALS
HEIGHT: 72 IN | OXYGEN SATURATION: 97 % | WEIGHT: 188 LBS | TEMPERATURE: 97.8 F | BODY MASS INDEX: 25.47 KG/M2 | SYSTOLIC BLOOD PRESSURE: 120 MMHG | DIASTOLIC BLOOD PRESSURE: 84 MMHG | HEART RATE: 78 BPM

## 2022-02-18 DIAGNOSIS — R41.89 COGNITIVE CHANGE: Primary | ICD-10-CM

## 2022-02-18 DIAGNOSIS — I67.9 SMALL VESSEL DISEASE, CEREBROVASCULAR: ICD-10-CM

## 2022-02-18 DIAGNOSIS — F17.200 CURRENT SMOKER: ICD-10-CM

## 2022-02-18 PROCEDURE — 99213 OFFICE O/P EST LOW 20 MIN: CPT | Performed by: FAMILY MEDICINE

## 2022-02-18 NOTE — PROGRESS NOTES
"  Subjective   Tim Kelly is a 72 y.o. male who is here for   Chief Complaint   Patient presents with   • cognitive decline     MRI f/u    • Dizziness   .     History of Present Illness   Tim is here in follow-up with his daughter for his cognitive decline.  Had brain MRI with IV contrast.  Revealed small vessel disease.  This related to age and lifelong smoking.  Long discussion today about stopping smoking.  MRI did also check out the auditory canals and his cochlea and semicircular canals are all normal.  He is now seeing court physical therapy.  Therapist does not think it is a vestibular issue.  More just strengthening and balance.  This along with the normal brain MRI mostly excludes vertigo as a diagnosis.  He also reports meclizine did not help.  Therefore we will stop that medication.  Continue with his physical therapy    The following portions of the patient's history were reviewed and updated as appropriate: allergies, current medications, past family history, past medical history, past social history, past surgical history and problem list.    Review of Systems    Objective   Vitals:    02/18/22 1317   BP: 120/84   BP Location: Left arm   Patient Position: Sitting   Cuff Size: Adult   Pulse: 78   Temp: 97.8 °F (36.6 °C)   SpO2: 97%   Weight: 85.3 kg (188 lb)   Height: 182.9 cm (72\")      Physical Exam  Vitals reviewed.   Cardiovascular:      Rate and Rhythm: Normal rate.   Pulmonary:      Effort: Pulmonary effort is normal.   Neurological:      General: No focal deficit present.      Mental Status: He is alert.       MRI Brain With & Without Contrast (02/17/2022 12:06)    Assessment/Plan   Diagnoses and all orders for this visit:    1. Cognitive change (Primary)    2. Current smoker    3. Small vessel disease, cerebrovascular      There are no Patient Instructions on file for this visit.    Medications Discontinued During This Encounter   Medication Reason   • meclizine (ANTIVERT) 25 MG tablet " *Therapy completed        No follow-ups on file.    Dr. Gagandeep Romero  [place], [place]

## 2022-04-22 DIAGNOSIS — I10 ESSENTIAL HYPERTENSION: ICD-10-CM

## 2022-04-22 RX ORDER — HYDROCHLOROTHIAZIDE 25 MG/1
25 TABLET ORAL DAILY
Qty: 90 TABLET | Refills: 0 | Status: SHIPPED | OUTPATIENT
Start: 2022-04-22 | End: 2022-07-18 | Stop reason: SDUPTHER

## 2022-04-22 NOTE — TELEPHONE ENCOUNTER
Caller: Tim Kelly    Relationship: Self    Best call back number: [phone]    Requested Prescriptions:   Requested Prescriptions     Pending Prescriptions Disp Refills   • hydroCHLOROthiazide (HYDRODIURIL) 25 MG tablet 90 tablet 0        Pharmacy where request should be sent: [facility] [address] [facility] AT [address] [phone] [facility] [phone]      Additional details provided by patient: OUT OF MEDICATIION GOING OUT OF TOW    Does the patient have less than a 3 day supply:  [x] Yes  [] No    Germain Pope   04/22/22 12:11 EDT

## 2022-07-11 DIAGNOSIS — I10 ESSENTIAL HYPERTENSION: ICD-10-CM

## 2022-07-11 RX ORDER — HYDROCHLOROTHIAZIDE 25 MG/1
TABLET ORAL
Qty: 90 TABLET | Refills: 0 | OUTPATIENT
Start: 2022-07-11

## 2022-07-18 DIAGNOSIS — I10 ESSENTIAL HYPERTENSION: ICD-10-CM

## 2022-07-18 RX ORDER — HYDROCHLOROTHIAZIDE 25 MG/1
25 TABLET ORAL DAILY
Qty: 90 TABLET | Refills: 0 | Status: SHIPPED | OUTPATIENT
Start: 2022-07-18 | End: 2022-08-30 | Stop reason: SINTOL

## 2022-07-18 NOTE — TELEPHONE ENCOUNTER
Caller: Tim Kelly    Relationship: Self    Best call back number: [phone]      Requested Prescriptions:   Requested Prescriptions     Pending Prescriptions Disp Refills   • hydroCHLOROthiazide (HYDRODIURIL) 25 MG tablet 90 tablet 0     Sig: Take 1 tablet by mouth Daily.        Pharmacy where request should be sent: [facility] [address] [facility] AT [address] [phone] [facility] [phone]      Additional details provided by patient: HAS ABOUT 3 DAYS LEFT.    Does the patient have less than a 3 day supply:  [x] Yes  [] No    Oksana Rosado, PCT   07/18/22 09:32 EDT

## 2022-08-22 DIAGNOSIS — I10 ESSENTIAL HYPERTENSION: ICD-10-CM

## 2022-08-22 DIAGNOSIS — R73.03 PREDIABETES: ICD-10-CM

## 2022-08-22 DIAGNOSIS — Z12.5 SCREENING FOR PROSTATE CANCER: ICD-10-CM

## 2022-08-22 DIAGNOSIS — E78.2 MIXED HYPERLIPIDEMIA: Primary | ICD-10-CM

## 2022-08-24 LAB
ALT SERPL-CCNC: 45 IU/L (ref 0–44)
APPEARANCE UR: CLEAR
BACTERIA #/AREA URNS HPF: NORMAL /[HPF]
BILIRUB UR QL STRIP: NEGATIVE
BUN SERPL-MCNC: 20 MG/DL (ref 8–27)
BUN/CREAT SERPL: 16 (ref 10–24)
CALCIUM SERPL-MCNC: 9.9 MG/DL (ref 8.6–10.2)
CASTS URNS QL MICRO: NORMAL /LPF
CHLORIDE SERPL-SCNC: 98 MMOL/L (ref 96–106)
CHOLEST SERPL-MCNC: 168 MG/DL (ref 100–199)
CO2 SERPL-SCNC: 26 MMOL/L (ref 20–29)
COLOR UR: YELLOW
CREAT SERPL-MCNC: 1.26 MG/DL (ref 0.76–1.27)
EGFRCR-CYS SERPLBLD CKD-EPI 2021: 61 ML/MIN/1.73
EPI CELLS #/AREA URNS HPF: NORMAL /HPF (ref 0–10)
ERYTHROCYTE [DISTWIDTH] IN BLOOD BY AUTOMATED COUNT: 12.3 % (ref 11.6–15.4)
GLUCOSE SERPL-MCNC: 140 MG/DL (ref 65–99)
GLUCOSE UR QL STRIP: NEGATIVE
HBA1C MFR BLD: 7 % (ref 4.8–5.6)
HCT VFR BLD AUTO: 47.3 % (ref 37.5–51)
HDLC SERPL-MCNC: 41 MG/DL
HGB BLD-MCNC: 16.1 G/DL (ref 13–17.7)
HGB UR QL STRIP: NEGATIVE
KETONES UR QL STRIP: NEGATIVE
LDLC SERPL CALC-MCNC: 97 MG/DL (ref 0–99)
LEUKOCYTE ESTERASE UR QL STRIP: NEGATIVE
MCH RBC QN AUTO: 30.9 PG (ref 26.6–33)
MCHC RBC AUTO-ENTMCNC: 34 G/DL (ref 31.5–35.7)
MCV RBC AUTO: 91 FL (ref 79–97)
MICRO URNS: ABNORMAL
NITRITE UR QL STRIP: NEGATIVE
PH UR STRIP: 6.5 [PH] (ref 5–7.5)
PLATELET # BLD AUTO: 205 X10E3/UL (ref 150–450)
POTASSIUM SERPL-SCNC: 4.1 MMOL/L (ref 3.5–5.2)
PROT UR QL STRIP: ABNORMAL
PSA SERPL-MCNC: 0.4 NG/ML (ref 0–4)
RBC # BLD AUTO: 5.21 X10E6/UL (ref 4.14–5.8)
RBC #/AREA URNS HPF: NORMAL /HPF (ref 0–2)
SODIUM SERPL-SCNC: 139 MMOL/L (ref 134–144)
SP GR UR STRIP: 1.03 (ref 1–1.03)
TRIGL SERPL-MCNC: 174 MG/DL (ref 0–149)
TSH SERPL DL<=0.005 MIU/L-ACNC: 2.99 UIU/ML (ref 0.45–4.5)
UROBILINOGEN UR STRIP-MCNC: 0.2 MG/DL (ref 0.2–1)
VLDLC SERPL CALC-MCNC: 30 MG/DL (ref 5–40)
WBC # BLD AUTO: 5.9 X10E3/UL (ref 3.4–10.8)
WBC #/AREA URNS HPF: NORMAL /HPF (ref 0–5)

## 2022-08-30 ENCOUNTER — OFFICE VISIT (OUTPATIENT)
Dept: FAMILY MEDICINE CLINIC | Facility: CLINIC | Age: 72
End: 2022-08-30

## 2022-08-30 VITALS
DIASTOLIC BLOOD PRESSURE: 70 MMHG | OXYGEN SATURATION: 96 % | BODY MASS INDEX: 24.65 KG/M2 | WEIGHT: 182 LBS | HEIGHT: 72 IN | TEMPERATURE: 97.3 F | SYSTOLIC BLOOD PRESSURE: 120 MMHG | HEART RATE: 81 BPM

## 2022-08-30 DIAGNOSIS — R41.89 COGNITIVE CHANGE: ICD-10-CM

## 2022-08-30 DIAGNOSIS — I10 ESSENTIAL HYPERTENSION: ICD-10-CM

## 2022-08-30 DIAGNOSIS — E11.65 TYPE 2 DIABETES MELLITUS WITH HYPERGLYCEMIA, WITHOUT LONG-TERM CURRENT USE OF INSULIN: ICD-10-CM

## 2022-08-30 DIAGNOSIS — Z00.00 MEDICARE ANNUAL WELLNESS VISIT, SUBSEQUENT: Primary | ICD-10-CM

## 2022-08-30 DIAGNOSIS — E55.9 VITAMIN D DEFICIENCY: ICD-10-CM

## 2022-08-30 PROBLEM — M70.21 OLECRANON BURSITIS OF RIGHT ELBOW: Status: RESOLVED | Noted: 2020-12-18 | Resolved: 2022-08-30

## 2022-08-30 PROBLEM — H40.1131 PRIMARY OPEN ANGLE GLAUCOMA (POAG) OF BOTH EYES, MILD STAGE: Status: ACTIVE | Noted: 2022-08-30

## 2022-08-30 PROCEDURE — 1160F RVW MEDS BY RX/DR IN RCRD: CPT | Performed by: FAMILY MEDICINE

## 2022-08-30 PROCEDURE — G0439 PPPS, SUBSEQ VISIT: HCPCS | Performed by: FAMILY MEDICINE

## 2022-08-30 PROCEDURE — 1170F FXNL STATUS ASSESSED: CPT | Performed by: FAMILY MEDICINE

## 2022-08-30 RX ORDER — METFORMIN HYDROCHLORIDE 750 MG/1
750 TABLET, EXTENDED RELEASE ORAL
Qty: 90 TABLET | Refills: 3 | Status: SHIPPED | OUTPATIENT
Start: 2022-08-30

## 2022-08-30 RX ORDER — LISINOPRIL 20 MG/1
20 TABLET ORAL DAILY
Qty: 90 TABLET | Refills: 3 | Status: SHIPPED | OUTPATIENT
Start: 2022-08-30 | End: 2022-10-23

## 2022-08-30 RX ORDER — MECLIZINE HYDROCHLORIDE 25 MG/1
25 TABLET ORAL 3 TIMES DAILY PRN
COMMUNITY
End: 2022-08-30

## 2022-08-30 NOTE — PROGRESS NOTES
The ABCs of the Annual Wellness Visit  Subsequent Medicare Wellness Visit    Chief Complaint   Patient presents with   • Medicare Wellness-subsequent   • Dizziness     Since last visit, meclizine not helping       Subjective    History of Present Illness:  Tim Kelly is a 72 y.o. male who presents for a Subsequent Medicare Wellness Visit.    The following portions of the patient's history were reviewed and   updated as appropriate: allergies, current medications, past family history, past medical history, past social history, past surgical history and problem list.    Compared to one year ago, the patient feels his physical   health is the same.    Compared to one year ago, the patient feels his mental   health is worse.    Recent Hospitalizations:  He was not admitted to the hospital during the last year.       Current Medical Providers:  Patient Care Team:  Gagandeep Romero MD as PCP - General (Family Medicine)  Zachariah Nguyen OD (Optometry)    Outpatient Medications Prior to Visit   Medication Sig Dispense Refill   • aspirin 81 MG chewable tablet Chew.     • pravastatin (PRAVACHOL) 80 MG tablet Take 1 tablet by mouth Daily. 90 tablet 3   • hydroCHLOROthiazide (HYDRODIURIL) 25 MG tablet Take 1 tablet by mouth Daily. 90 tablet 0   • lisinopril (PRINIVIL,ZESTRIL) 20 MG tablet TAKE ONE TABLET BY MOUTH DAILY 90 tablet 3   • meclizine (ANTIVERT) 25 MG tablet Take 25 mg by mouth 3 (Three) Times a Day As Needed for Dizziness.       No facility-administered medications prior to visit.       No opioid medication identified on active medication list. I have reviewed chart for other potential  high risk medication/s and harmful drug interactions in the elderly.          Aspirin is on active medication list. Aspirin use is indicated based on review of current medical condition/s. Pros and cons of this therapy have been discussed today. Benefits of this medication outweigh potential harm.  Patient has been  "encouraged to continue taking this medication.  .      Patient Active Problem List   Diagnosis   • Mixed hyperlipidemia   • Essential hypertension   • Screening for prostate cancer   • Current smoker   • Medication monitoring encounter   • Elevated serum creatinine   • Medicare annual wellness visit, subsequent   • Type 2 diabetes mellitus with hyperglycemia, without long-term current use of insulin (HCC)   • Elevated ALT measurement   • Hypertriglyceridemia   • Encounter for screening for lung cancer   • Vertigo   • Cognitive change   • Small vessel disease, cerebrovascular   • Primary open angle glaucoma (POAG) of both eyes, mild stage     Advance Care Planning  Advance Directive is not on file.  ACP discussion was held with the patient during this visit. Patient has an advance directive (not in EMR), copy requested.          Objective    Vitals:    08/30/22 1345   BP: 120/70   BP Location: Left arm   Patient Position: Sitting   Cuff Size: Adult   Pulse: 81   Temp: 97.3 °F (36.3 °C)   SpO2: 96%   Weight: 82.6 kg (182 lb)   Height: 182.9 cm (72\")     Estimated body mass index is 24.68 kg/m² as calculated from the following:    Height as of this encounter: 182.9 cm (72\").    Weight as of this encounter: 82.6 kg (182 lb).    BMI is within normal parameters. No other follow-up for BMI required.      Does the patient have evidence of cognitive impairment? Yes    Physical Exam  Vitals reviewed.   Cardiovascular:      Rate and Rhythm: Normal rate.   Pulmonary:      Effort: Pulmonary effort is normal.   Neurological:      General: No focal deficit present.      Mental Status: He is alert.   Psychiatric:         Mood and Affect: Mood normal.       Lab Results   Component Value Date    CHLPL 168 08/23/2022    TRIG 174 (H) 08/23/2022    HDL 41 08/23/2022    LDL 97 08/23/2022    VLDL 30 08/23/2022    HGBA1C 7.0 (H) 08/23/2022            HEALTH RISK ASSESSMENT    Smoking Status:  Social History     Tobacco Use   Smoking Status " Current Every Day Smoker   • Packs/day: 1.00   • Years: 55.00   • Pack years: 55.00   • Types: Cigarettes   Smokeless Tobacco Never Used   Tobacco Comment    started as a teenager     Alcohol Consumption:  Social History     Substance and Sexual Activity   Alcohol Use Yes   • Alcohol/week: 7.0 standard drinks   • Types: 7 Glasses of wine per week    Comment: 1 a day     Fall Risk Screen:    NORMAN Fall Risk Assessment was completed, and patient is at HIGH risk for falls. Assessment completed on:8/30/2022    Depression Screening:  PHQ-2/PHQ-9 Depression Screening 8/30/2022   Retired PHQ-9 Total Score -   Retired Total Score -   Little Interest or Pleasure in Doing Things 0-->not at all   Feeling Down, Depressed or Hopeless 0-->not at all   PHQ-9: Brief Depression Severity Measure Score 0       Health Habits and Functional and Cognitive Screening:  Functional & Cognitive Status 8/30/2022   Do you have difficulty preparing food and eating? No   Do you have difficulty bathing yourself, getting dressed or grooming yourself? No   Do you have difficulty using the toilet? No   Do you have difficulty moving around from place to place? No   Do you have trouble with steps or getting out of a bed or a chair? No   Current Diet Well Balanced Diet   Dental Exam Up to date   Eye Exam Up to date   Exercise (times per week) 7 times per week   Current Exercises Include Other   Current Exercise Activities Include -   Do you need help using the phone?  No   Are you deaf or do you have serious difficulty hearing?  Yes   Do you need help with transportation? No   Do you need help shopping? No   Do you need help preparing meals?  No   Do you need help with housework?  No   Do you need help with laundry? No   Do you need help taking your medications? No   Do you need help managing money? No   Do you ever drive or ride in a car without wearing a seat belt? No   Have you felt unusual stress, anger or loneliness in the last month? No   Who do  you live with? Spouse   If you need help, do you have trouble finding someone available to you? No   Have you been bothered in the last four weeks by sexual problems? No   Do you have difficulty concentrating, remembering or making decisions? No       Age-appropriate Screening Schedule:  Refer to the list below for future screening recommendations based on patient's age, sex and/or medical conditions. Orders for these recommended tests are listed in the plan section. The patient has been provided with a written plan.    Health Maintenance   Topic Date Due   • TDAP/TD VACCINES (1 - Tdap) Never done   • ZOSTER VACCINE (2 of 3) 08/10/2016   • INFLUENZA VACCINE  10/01/2022   • LIPID PANEL  08/23/2023              Assessment & Plan   CMS Preventative Services Quick Reference  Risk Factors Identified During Encounter  Dementia/Memory   Glaucoma or Family History of Glaucoma  The above risks/problems have been discussed with the patient.  Follow up actions/plans if indicated are seen below in the Assessment/Plan Section.  Pertinent information has been shared with the patient in the After Visit Summary.    Diagnoses and all orders for this visit:    1. Medicare annual wellness visit, subsequent (Primary)    2. Type 2 diabetes mellitus with hyperglycemia, without long-term current use of insulin (HCC)  -     Basic Metabolic Panel; Future  -     Lipid Panel; Future  -     Hemoglobin A1c; Future    3. Essential hypertension  -     lisinopril (PRINIVIL,ZESTRIL) 20 MG tablet; Take 1 tablet by mouth Daily. Indications: High Blood Pressure Disorder  Dispense: 90 tablet; Refill: 3  -     Basic Metabolic Panel; Future  -     Lipid Panel; Future  -     Hemoglobin A1c; Future    4. Cognitive change  -     Vitamin B12; Future  -     Folate; Future    5. Vitamin D deficiency  -     Vitamin D 25 Hydroxy; Future    Other orders  -     metFORMIN ER (GLUCOPHAGE-XR) 750 MG 24 hr tablet; Take 1 tablet by mouth Daily With Breakfast.  Indications: Type 2 Diabetes  Dispense: 90 tablet; Refill: 3    Reviewed his lab testing, his prediabetes is now full-blown new diagnosis type 2 diabetes we discussed reducing all the junk food and sweets in his diet and starting metformin today we will recheck diabetic labs in 3 months    His memory is about the same.  Has not gotten worse    Great news he stopped smoking cigarettes 6 months ago.  He smoked for over 50 years    He is still getting lightheaded.  Denies any vertigo sensation.  Lets go and stop his hydrochlorothiazide today as he might be getting orthostatic hypotension.      Follow Up:   No follow-ups on file.     An After Visit Summary and PPPS were made available to the patient.

## 2022-10-13 DIAGNOSIS — I10 ESSENTIAL HYPERTENSION: ICD-10-CM

## 2022-10-13 RX ORDER — HYDROCHLOROTHIAZIDE 25 MG/1
TABLET ORAL
Qty: 90 TABLET | Refills: 0 | OUTPATIENT
Start: 2022-10-13

## 2022-10-23 DIAGNOSIS — I10 ESSENTIAL HYPERTENSION: ICD-10-CM

## 2022-10-23 RX ORDER — LISINOPRIL 20 MG/1
TABLET ORAL
Qty: 30 TABLET | Refills: 0 | Status: SHIPPED | OUTPATIENT
Start: 2022-10-23 | End: 2023-03-27 | Stop reason: SDUPTHER

## 2022-11-18 ENCOUNTER — TELEPHONE (OUTPATIENT)
Dept: FAMILY MEDICINE CLINIC | Facility: CLINIC | Age: 72
End: 2022-11-18

## 2022-11-19 LAB
25(OH)D3+25(OH)D2 SERPL-MCNC: 83.8 NG/ML (ref 30–100)
BUN SERPL-MCNC: 23 MG/DL (ref 8–27)
BUN/CREAT SERPL: 16 (ref 10–24)
CALCIUM SERPL-MCNC: 10.3 MG/DL (ref 8.6–10.2)
CHLORIDE SERPL-SCNC: 101 MMOL/L (ref 96–106)
CHOLEST SERPL-MCNC: 164 MG/DL (ref 100–199)
CO2 SERPL-SCNC: 25 MMOL/L (ref 20–29)
CREAT SERPL-MCNC: 1.42 MG/DL (ref 0.76–1.27)
EGFRCR SERPLBLD CKD-EPI 2021: 53 ML/MIN/1.73
FOLATE SERPL-MCNC: 12.8 NG/ML
GLUCOSE SERPL-MCNC: 121 MG/DL (ref 70–99)
HBA1C MFR BLD: 5.9 % (ref 4.8–5.6)
HDLC SERPL-MCNC: 50 MG/DL
LDLC SERPL CALC-MCNC: 90 MG/DL (ref 0–99)
POTASSIUM SERPL-SCNC: 5.1 MMOL/L (ref 3.5–5.2)
SODIUM SERPL-SCNC: 139 MMOL/L (ref 134–144)
TRIGL SERPL-MCNC: 134 MG/DL (ref 0–149)
VIT B12 SERPL-MCNC: 402 PG/ML (ref 232–1245)
VLDLC SERPL CALC-MCNC: 24 MG/DL (ref 5–40)

## 2022-11-29 ENCOUNTER — OFFICE VISIT (OUTPATIENT)
Dept: FAMILY MEDICINE CLINIC | Facility: CLINIC | Age: 72
End: 2022-11-29

## 2022-11-29 VITALS
TEMPERATURE: 96.9 F | HEIGHT: 72 IN | DIASTOLIC BLOOD PRESSURE: 86 MMHG | SYSTOLIC BLOOD PRESSURE: 140 MMHG | OXYGEN SATURATION: 97 % | WEIGHT: 179 LBS | BODY MASS INDEX: 24.24 KG/M2 | HEART RATE: 94 BPM

## 2022-11-29 DIAGNOSIS — R41.89 COGNITIVE CHANGE: ICD-10-CM

## 2022-11-29 DIAGNOSIS — E11.65 TYPE 2 DIABETES MELLITUS WITH HYPERGLYCEMIA, WITHOUT LONG-TERM CURRENT USE OF INSULIN: Primary | ICD-10-CM

## 2022-11-29 PROCEDURE — 99214 OFFICE O/P EST MOD 30 MIN: CPT | Performed by: FAMILY MEDICINE

## 2022-11-29 NOTE — PROGRESS NOTES
"  Chief Complaint   Patient presents with   • Diabetes   • Hypertension            Answers for HPI/ROS submitted by the patient on 11/22/2022  What is the primary reason for your visit?: Other  Please describe your symptoms.: Follow up  Have you had these symptoms before?: No  How long have you been having these symptoms?: 1-4 days      Subjective   Tim Kelly is an 72 y.o. male who presents for follow up of diabetes. Current symptoms include: none. Patient denies foot ulcerations, paresthesia of the feet and visual disturbances. Evaluation to date has included: fasting lipid panel and hemoglobin A1C. Home sugars: patient does not check sugars. Current treatments: Continued metformin which has been effective. Discussed importance of yearly eye exams and checking feet for skin integrity.  Secondly,  Both daughters are here today they are more concerned about his cognitive decline.  They are specifically worried about Parkinson's.  He is having word finding problems.  He is getting unsteady on his feet and has fallen several times at home.  His handwriting is becoming difficult to read.  Has no tremor.  Has no family history of Alzheimer's or Parkinson's.  No history of significant head injury or concussions.  His speech is becoming slower.  He is lost expression to his face.        The following portions of the patient's history were reviewed and updated as appropriate: allergies, current medications, past family history, past medical history, past social history, past surgical history and problem list.        Review of Systems  A comprehensive review of systems was negative except for: Neurological: positive for coordination problems, memory problems, paresthesia and weakness     Vitals:    11/29/22 1315   BP: 140/86   BP Location: Left arm   Patient Position: Sitting   Cuff Size: Adult   Pulse: 94   Temp: 96.9 °F (36.1 °C)   SpO2: 97%   Weight: 81.2 kg (179 lb)   Height: 182.9 cm (72\")       Objective  "   Gen:  Alert, pleasant  Ears: canals clear, TMs normal  Throat: clear , no thrush, teeth ok  Neck: no bruit, no LAD  Lungs: clear  Heart: RR no murmur  Feet:  No rash, no skin breakdown, sensation grossly normal.    Laboratory:  Results for orders placed or performed in visit on 08/30/22   Folate    Specimen: Blood   Result Value Ref Range    Folate 12.8 >3.0 ng/mL   Vitamin B12    Specimen: Blood   Result Value Ref Range    Vitamin B-12 402 232 - 1,245 pg/mL   Vitamin D 25 Hydroxy    Specimen: Blood   Result Value Ref Range    25 Hydroxy, Vitamin D 83.8 30.0 - 100.0 ng/mL   Hemoglobin A1c    Specimen: Blood   Result Value Ref Range    Hemoglobin A1C 5.9 (H) 4.8 - 5.6 %   Lipid Panel    Specimen: Blood   Result Value Ref Range    Total Cholesterol 164 100 - 199 mg/dL    Triglycerides 134 0 - 149 mg/dL    HDL Cholesterol 50 >39 mg/dL    VLDL Cholesterol Vick 24 5 - 40 mg/dL    LDL Chol Calc (NIH) 90 0 - 99 mg/dL   Basic Metabolic Panel    Specimen: Blood   Result Value Ref Range    Glucose 121 (H) 70 - 99 mg/dL    BUN 23 8 - 27 mg/dL    Creatinine 1.42 (H) 0.76 - 1.27 mg/dL    EGFR Result 53 (L) >59 mL/min/1.73    BUN/Creatinine Ratio 16 10 - 24    Sodium 139 134 - 144 mmol/L    Potassium 5.1 3.5 - 5.2 mmol/L    Chloride 101 96 - 106 mmol/L    Total CO2 25 20 - 29 mmol/L    Calcium 10.3 (H) 8.6 - 10.2 mg/dL      MRI Brain With & Without Contrast (02/17/2022 12:06)    Assessment & Plan        Discussed general issues about diabetes pathophysiology and management.  Continued metformin; see  medication orders.  Follow up in 2 months or as needed.    Diagnoses and all orders for this visit:    1. Type 2 diabetes mellitus with hyperglycemia, without long-term current use of insulin (HCC) (Primary)    2. Cognitive change  -     Ambulatory Referral to Neurology    Concerning for the beginnings of Parkinson's.  We will send him off to neurology for an evaluation      Discussed healthy diabetic eating plan.  May refer to ADA  web site, diabetes.org    Return in about 2 months (around 1/29/2023).  There are no Patient Instructions on file for this visit.  There are no discontinued medications.      Dr. Gagandeep Romero MD  [place]  [facility].

## 2022-12-07 ENCOUNTER — TELEPHONE (OUTPATIENT)
Dept: FAMILY MEDICINE CLINIC | Facility: CLINIC | Age: 72
End: 2022-12-07

## 2022-12-07 DIAGNOSIS — R41.89 COGNITIVE CHANGE: Primary | ICD-10-CM

## 2022-12-07 NOTE — TELEPHONE ENCOUNTER
Ok  New neuro referral has been made    Gagandeep Romero MD    Caller: ALYCIA LOPEZ    Relationship: Child    Best call back number: [phone]    What specialty or service is being requested: REFERRAL TO NEUROLOGY    What is the provider, practice or medical service name: [facility]- DR. DAX SAEZ     What is the office phone number: [phone]    Any additional details: WANTING A REFERRAL TO THIS DOCTOR IN [facility] OVER THE DOCTOR IN [facility]

## 2023-02-16 DIAGNOSIS — E78.2 MIXED HYPERLIPIDEMIA: ICD-10-CM

## 2023-02-16 RX ORDER — PRAVASTATIN SODIUM 80 MG/1
TABLET ORAL
Qty: 90 TABLET | Refills: 0 | Status: SHIPPED | OUTPATIENT
Start: 2023-02-16

## 2023-03-03 ENCOUNTER — OFFICE VISIT (OUTPATIENT)
Dept: FAMILY MEDICINE CLINIC | Facility: CLINIC | Age: 73
End: 2023-03-03
Payer: MEDICARE

## 2023-03-03 VITALS
OXYGEN SATURATION: 99 % | SYSTOLIC BLOOD PRESSURE: 110 MMHG | HEART RATE: 78 BPM | HEIGHT: 72 IN | DIASTOLIC BLOOD PRESSURE: 80 MMHG | TEMPERATURE: 97.9 F | WEIGHT: 181 LBS | BODY MASS INDEX: 24.52 KG/M2

## 2023-03-03 DIAGNOSIS — R07.9 CHEST PAIN, UNSPECIFIED TYPE: ICD-10-CM

## 2023-03-03 DIAGNOSIS — R55 SYNCOPE AND COLLAPSE: Primary | ICD-10-CM

## 2023-03-03 PROCEDURE — 99214 OFFICE O/P EST MOD 30 MIN: CPT | Performed by: FAMILY MEDICINE

## 2023-03-03 RX ORDER — BRIMONIDINE TARTRATE 2 MG/ML
SOLUTION/ DROPS OPHTHALMIC
COMMUNITY
Start: 2023-02-09

## 2023-03-03 RX ORDER — HYDROCHLOROTHIAZIDE 25 MG/1
25 TABLET ORAL DAILY
COMMUNITY
End: 2023-03-03

## 2023-03-03 NOTE — PROGRESS NOTES
"  Subjective   Tim Kelly is a 73 y.o. male who is here for   Chief Complaint   Patient presents with   • Loss of Consciousness   • Dizziness   .     History of Present Illness   Mr. Kelly is brought in by his daughter not doing well  [place].  He is having lots of dizzy spells.  He has advancing cognitive decline  As well as balance issues.  Was able to see [facility].  Reports a mixed picture of dizziness off balance and passing out.  Concerning for vascular dementia versus Parkinson's  We were concerned about orthostatic hypotension, his hydrochlorothiazide was discontinued.  Remaining on lisinopril due to underlying hypertension  Patient restarted HCTZ this week, blood pressure is low today  Daughter reports his blood pressure is all over the place up and down  Again we would compromise on having his blood pressure on the higher side to avoid orthostatic hypotension and falling events      The following portions of the patient's history were reviewed and updated as appropriate: allergies, current medications, past family history, past medical history, past social history, past surgical history and problem list.    Review of Systems    Objective   Vitals:    03/03/23 0952   BP: 110/80   Pulse: 78   Temp: 97.9 °F (36.6 °C)   SpO2: 99%   Weight: 82.1 kg (181 lb)   Height: 182.9 cm (72\")      Physical Exam  Constitutional:       General: He is not in acute distress.  Cardiovascular:      Rate and Rhythm: Normal rate.   Pulmonary:      Effort: Pulmonary effort is normal.   Neurological:      Mental Status: He is alert.      Motor: Weakness present.      Coordination: Coordination abnormal.      Gait: Gait abnormal.         Assessment & Plan   Diagnoses and all orders for this visit:    1. Syncope and collapse (Primary)  -     Stress Test With Myocardial Perfusion - One Day; Future    2. Chest pain, unspecified type  -     Stress Test With Myocardial Perfusion " - One Day; Future    Lets stop the hydrochlorothiazide  Discussed fall prevention  Asked him to always use the bathroom in the sitting position both for BMs and urination to avoid falls    Due to the syncopal issues we will order a nuclear stress test to make sure his heart is in good shape  We will see him back in 2 weeks    He has several test as ordered by the neurologist    There are no Patient Instructions on file for this visit.    Medications Discontinued During This Encounter   Medication Reason   • hydroCHLOROthiazide (HYDRODIURIL) 25 MG tablet *Therapy completed        Return in about 2 weeks (around 3/17/2023) for follow up heart test.    Dr. Gagandeep Romero  [facility]  [place], [place]

## 2023-03-15 ENCOUNTER — HOSPITAL ENCOUNTER (OUTPATIENT)
Dept: NUCLEAR MEDICINE | Facility: HOSPITAL | Age: 73
Discharge: HOME | End: 2023-03-15
Payer: MEDICARE

## 2023-03-15 DIAGNOSIS — R07.9 CHEST PAIN, UNSPECIFIED TYPE: ICD-10-CM

## 2023-03-15 DIAGNOSIS — R55 SYNCOPE AND COLLAPSE: ICD-10-CM

## 2023-03-15 LAB
BH CV REST NUCLEAR ISOTOPE DOSE: 10.3 MCI
BH CV STRESS COMMENTS STAGE 1: NORMAL
BH CV STRESS DOSE REGADENOSON STAGE 1: 0.4
BH CV STRESS DURATION MIN STAGE 1: 0
BH CV STRESS DURATION SEC STAGE 1: 10
BH CV STRESS NUCLEAR ISOTOPE DOSE: 32 MCI
BH CV STRESS PROTOCOL 1: NORMAL
BH CV STRESS RECOVERY BP: NORMAL MMHG
BH CV STRESS RECOVERY HR: 69 BPM
BH CV STRESS STAGE 1: 1
LV EF NUC BP: 66 %
MAXIMAL PREDICTED HEART RATE: 147 BPM
PERCENT MAX PREDICTED HR: 59.18 %
STRESS BASELINE BP: NORMAL MMHG
STRESS BASELINE HR: 59 BPM
STRESS PERCENT HR: 70 %
STRESS POST PEAK BP: NORMAL MMHG
STRESS POST PEAK HR: 87 BPM
STRESS TARGET HR: 125 BPM

## 2023-03-15 PROCEDURE — 0 TECHNETIUM SESTAMIBI: Performed by: FAMILY MEDICINE

## 2023-03-15 PROCEDURE — 25010000002 REGADENOSON 0.4 MG/5ML SOLUTION: Performed by: FAMILY MEDICINE

## 2023-03-15 PROCEDURE — A9500 TC99M SESTAMIBI: HCPCS | Performed by: FAMILY MEDICINE

## 2023-03-15 PROCEDURE — 78452 HT MUSCLE IMAGE SPECT MULT: CPT | Performed by: INTERNAL MEDICINE

## 2023-03-15 PROCEDURE — 93017 CV STRESS TEST TRACING ONLY: CPT

## 2023-03-15 PROCEDURE — 93018 CV STRESS TEST I&R ONLY: CPT | Performed by: INTERNAL MEDICINE

## 2023-03-15 PROCEDURE — 78452 HT MUSCLE IMAGE SPECT MULT: CPT

## 2023-03-15 PROCEDURE — 93016 CV STRESS TEST SUPVJ ONLY: CPT | Performed by: INTERNAL MEDICINE

## 2023-03-15 RX ADMIN — REGADENOSON 0.4 MG: 0.08 INJECTION, SOLUTION INTRAVENOUS at 08:55

## 2023-03-15 RX ADMIN — TECHNETIUM TC 99M SESTAMIBI 1 DOSE: 1 INJECTION INTRAVENOUS at 08:55

## 2023-03-15 RX ADMIN — TECHNETIUM TC 99M SESTAMIBI 1 DOSE: 1 INJECTION INTRAVENOUS at 07:25

## 2023-03-27 ENCOUNTER — OFFICE VISIT (OUTPATIENT)
Dept: FAMILY MEDICINE CLINIC | Facility: CLINIC | Age: 73
End: 2023-03-27
Payer: MEDICARE

## 2023-03-27 VITALS
HEIGHT: 72 IN | BODY MASS INDEX: 24.24 KG/M2 | TEMPERATURE: 97.9 F | WEIGHT: 179 LBS | SYSTOLIC BLOOD PRESSURE: 118 MMHG | DIASTOLIC BLOOD PRESSURE: 78 MMHG | HEART RATE: 70 BPM | OXYGEN SATURATION: 96 %

## 2023-03-27 DIAGNOSIS — I10 ESSENTIAL HYPERTENSION: ICD-10-CM

## 2023-03-27 PROCEDURE — 1159F MED LIST DOCD IN RCRD: CPT | Performed by: FAMILY MEDICINE

## 2023-03-27 PROCEDURE — 99213 OFFICE O/P EST LOW 20 MIN: CPT | Performed by: FAMILY MEDICINE

## 2023-03-27 PROCEDURE — 3078F DIAST BP <80 MM HG: CPT | Performed by: FAMILY MEDICINE

## 2023-03-27 PROCEDURE — 3074F SYST BP LT 130 MM HG: CPT | Performed by: FAMILY MEDICINE

## 2023-03-27 PROCEDURE — 1160F RVW MEDS BY RX/DR IN RCRD: CPT | Performed by: FAMILY MEDICINE

## 2023-03-27 RX ORDER — LISINOPRIL 5 MG/1
5 TABLET ORAL DAILY
Qty: 90 TABLET | Refills: 0 | Status: SHIPPED | OUTPATIENT
Start: 2023-03-27

## 2023-03-27 NOTE — PROGRESS NOTES
"  Subjective   Tim Kelly is a 73 y.o. male who is here for   Chief Complaint   Patient presents with   • Dizziness   .     History of Present Illness     Feeling better, as we discontinue the hydrochlorothiazide last visit  Only 1 or 2 spells of mild lightheadedness.  Without falls  He still remains on 20 of lisinopril    He underwent nuclear stress test the heart which was all normal.  This eliminate cardiac pathology.    He is here with his son today    His mild dementia is stable.  He is handling it fairly well.  Son reports has not gotten any worse      The following portions of the patient's history were reviewed and updated as appropriate: allergies, current medications, past family history, past medical history, past social history, past surgical history and problem list.    Review of Systems    Objective   Vitals:    03/27/23 0839   BP: 118/78   Pulse: 70   Temp: 97.9 °F (36.6 °C)   SpO2: 96%   Weight: 81.2 kg (179 lb)   Height: 182.9 cm (72.01\")      Physical Exam  Vitals reviewed.   Cardiovascular:      Rate and Rhythm: Normal rate.   Neurological:      Mental Status: He is alert.       Stress Test With Myocardial Perfusion - One Day (03/15/2023 10:43)    Assessment & Plan   Diagnoses and all orders for this visit:    1. Essential hypertension  -     lisinopril (PRINIVIL,ZESTRIL) 5 MG tablet; Take 1 tablet by mouth Daily. Indications: High Blood Pressure Disorder  Dispense: 90 tablet; Refill: 0    Still has some mild episodes of dizziness, perhaps orthostatic hypotension  Lets decrease his lisinopril from 20 down to 5 mg  We will see him back in 1 month  There are no Patient Instructions on file for this visit.    Medications Discontinued During This Encounter   Medication Reason   • lisinopril (PRINIVIL,ZESTRIL) 20 MG tablet Reorder        Return in about 1 month (around 4/27/2023) for blood pressure, new medication follow up.    Dr. Gagandeep Romero  [facility]  [place], [place]

## 2023-04-27 ENCOUNTER — OFFICE VISIT (OUTPATIENT)
Dept: FAMILY MEDICINE CLINIC | Facility: CLINIC | Age: 73
End: 2023-04-27
Payer: MEDICARE

## 2023-04-27 VITALS
BODY MASS INDEX: 25.06 KG/M2 | HEART RATE: 88 BPM | HEIGHT: 72 IN | DIASTOLIC BLOOD PRESSURE: 82 MMHG | OXYGEN SATURATION: 99 % | SYSTOLIC BLOOD PRESSURE: 128 MMHG | TEMPERATURE: 98.2 F | WEIGHT: 185 LBS

## 2023-04-27 DIAGNOSIS — E11.65 TYPE 2 DIABETES MELLITUS WITH HYPERGLYCEMIA, WITHOUT LONG-TERM CURRENT USE OF INSULIN: ICD-10-CM

## 2023-04-27 DIAGNOSIS — R41.89 COGNITIVE CHANGE: ICD-10-CM

## 2023-04-27 DIAGNOSIS — I67.9 SMALL VESSEL DISEASE, CEREBROVASCULAR: Primary | ICD-10-CM

## 2023-04-27 PROCEDURE — 3079F DIAST BP 80-89 MM HG: CPT | Performed by: FAMILY MEDICINE

## 2023-04-27 PROCEDURE — 1159F MED LIST DOCD IN RCRD: CPT | Performed by: FAMILY MEDICINE

## 2023-04-27 PROCEDURE — 1160F RVW MEDS BY RX/DR IN RCRD: CPT | Performed by: FAMILY MEDICINE

## 2023-04-27 PROCEDURE — 3074F SYST BP LT 130 MM HG: CPT | Performed by: FAMILY MEDICINE

## 2023-04-27 PROCEDURE — 99214 OFFICE O/P EST MOD 30 MIN: CPT | Performed by: FAMILY MEDICINE

## 2023-04-27 NOTE — PROGRESS NOTES
"  Subjective   Tim Kelly is a 73 y.o. male who is here for   Chief Complaint   Patient presents with   • Hypertension   .     History of Present Illness     Tim brought in by his daughter.  Overall doing fairly well at home.  Memory is about the same.  He is still able to live by himself.  But family checks on him daily.  Is taking his medications routinely  He is on new glaucoma drops and that is seem to help his vision  He is up-to-date with [facility] neurology.  He is now in speech therapy at [facility].  Reports he will be going to occupational therapy soon.  He still having lightheaded spells where he feels like he might pass out.  But no syncopal episodes since last visit  Last visit we dropped his lisinopril from 20 down to 5 mg  Blood pressures are 120s systolic at home    We reviewed the report of his brain MRI.  He has mild general atrophy  And moderate small vessel disease.    Thirdly,  We discussed his diabetes.  Last A1c 5.9.  Doing well on metformin.  Daughter would like some information on the glucometers.  Suggest a go to [facility] and purchase one.  They are over-the-counter.  And check blood sugar when he is having symptoms.      The following portions of the patient's history were reviewed and updated as appropriate: allergies, current medications, past family history, past medical history, past social history, past surgical history and problem list.    Review of Systems    Objective   Vitals:    04/27/23 0818 04/27/23 0834 04/27/23 0837   BP: 122/80 133/90 128/82   BP Location:  Right arm Left arm   Patient Position:  Sitting Sitting   Pulse: 88     Temp: 98.2 °F (36.8 °C)     SpO2: 99%     Weight: 83.9 kg (185 lb)     Height: 182.9 cm (72\")        Physical Exam  Vitals reviewed.   Cardiovascular:      Rate and Rhythm: Normal rate.   Pulmonary:      Effort: Pulmonary effort is normal.   Neurological:      General: No focal deficit present.      Mental Status: He is alert.      Coordination: " Coordination abnormal.       MRI Brain Mem Protocol (04/10/2023 18:49)    Assessment & Plan   Diagnoses and all orders for this visit:    1. Small vessel disease, cerebrovascular (Primary)    2. Cognitive change    3. Type 2 diabetes mellitus with hyperglycemia, without long-term current use of insulin (HCC)    Blood pressure seems stable at this point.  Want to avoid hypotension  Continue with [facility] therapy and Occupational Therapy  We will see him back in a month  There are no Patient Instructions on file for this visit.    There are no discontinued medications.     Return in about 1 month (around 5/27/2023) for Next scheduled follow up, blood pressure, diabetes.    Dr. Gagandeep Romero  [facility] [place], [place]

## 2023-05-14 DIAGNOSIS — E78.2 MIXED HYPERLIPIDEMIA: ICD-10-CM

## 2023-05-14 RX ORDER — PRAVASTATIN SODIUM 80 MG/1
TABLET ORAL
Qty: 90 TABLET | Refills: 3 | Status: SHIPPED | OUTPATIENT
Start: 2023-05-14

## 2023-05-22 DIAGNOSIS — E11.65 TYPE 2 DIABETES MELLITUS WITH HYPERGLYCEMIA, WITHOUT LONG-TERM CURRENT USE OF INSULIN: Primary | ICD-10-CM

## 2023-05-22 DIAGNOSIS — E11.65 TYPE 2 DIABETES MELLITUS WITH HYPERGLYCEMIA, WITHOUT LONG-TERM CURRENT USE OF INSULIN: ICD-10-CM

## 2023-05-24 LAB
ALBUMIN SERPL-MCNC: 4.6 G/DL (ref 3.7–4.7)
ALBUMIN/GLOB SERPL: 1.9 {RATIO} (ref 1.2–2.2)
ALP SERPL-CCNC: 94 IU/L (ref 44–121)
ALT SERPL-CCNC: 21 IU/L (ref 0–44)
AST SERPL-CCNC: 20 IU/L (ref 0–40)
BILIRUB SERPL-MCNC: 2.2 MG/DL (ref 0–1.2)
BUN SERPL-MCNC: 24 MG/DL (ref 8–27)
BUN/CREAT SERPL: 20 (ref 10–24)
CALCIUM SERPL-MCNC: 9.6 MG/DL (ref 8.6–10.2)
CHLORIDE SERPL-SCNC: 102 MMOL/L (ref 96–106)
CHOLEST SERPL-MCNC: 170 MG/DL (ref 100–199)
CO2 SERPL-SCNC: 23 MMOL/L (ref 20–29)
CREAT SERPL-MCNC: 1.18 MG/DL (ref 0.76–1.27)
EGFRCR SERPLBLD CKD-EPI 2021: 65 ML/MIN/1.73
ERYTHROCYTE [DISTWIDTH] IN BLOOD BY AUTOMATED COUNT: 12.7 % (ref 11.6–15.4)
GLOBULIN SER CALC-MCNC: 2.4 G/DL (ref 1.5–4.5)
GLUCOSE SERPL-MCNC: 110 MG/DL (ref 70–99)
HBA1C MFR BLD: 6.6 % (ref 4.8–5.6)
HCT VFR BLD AUTO: 47.8 % (ref 37.5–51)
HDLC SERPL-MCNC: 42 MG/DL
HGB BLD-MCNC: 15.5 G/DL (ref 13–17.7)
LDLC SERPL CALC-MCNC: 91 MG/DL (ref 0–99)
MCH RBC QN AUTO: 29.8 PG (ref 26.6–33)
MCHC RBC AUTO-ENTMCNC: 32.4 G/DL (ref 31.5–35.7)
MCV RBC AUTO: 92 FL (ref 79–97)
MICROALBUMIN UR-MCNC: 6.3 UG/ML
PLATELET # BLD AUTO: 196 X10E3/UL (ref 150–450)
POTASSIUM SERPL-SCNC: 4.3 MMOL/L (ref 3.5–5.2)
PROT SERPL-MCNC: 7 G/DL (ref 6–8.5)
RBC # BLD AUTO: 5.21 X10E6/UL (ref 4.14–5.8)
SODIUM SERPL-SCNC: 140 MMOL/L (ref 134–144)
TRIGL SERPL-MCNC: 221 MG/DL (ref 0–149)
VLDLC SERPL CALC-MCNC: 37 MG/DL (ref 5–40)
WBC # BLD AUTO: 6 X10E3/UL (ref 3.4–10.8)

## 2023-05-30 ENCOUNTER — OFFICE VISIT (OUTPATIENT)
Dept: FAMILY MEDICINE CLINIC | Facility: CLINIC | Age: 73
End: 2023-05-30

## 2023-05-30 VITALS
DIASTOLIC BLOOD PRESSURE: 96 MMHG | HEART RATE: 94 BPM | SYSTOLIC BLOOD PRESSURE: 124 MMHG | HEIGHT: 72 IN | TEMPERATURE: 98.1 F | BODY MASS INDEX: 24.52 KG/M2 | WEIGHT: 181 LBS | OXYGEN SATURATION: 97 %

## 2023-05-30 DIAGNOSIS — R41.89 COGNITIVE CHANGE: ICD-10-CM

## 2023-05-30 DIAGNOSIS — E11.65 TYPE 2 DIABETES MELLITUS WITH HYPERGLYCEMIA, WITHOUT LONG-TERM CURRENT USE OF INSULIN: Primary | ICD-10-CM

## 2023-05-30 DIAGNOSIS — E78.2 MIXED HYPERLIPIDEMIA: ICD-10-CM

## 2023-05-30 DIAGNOSIS — I10 ESSENTIAL HYPERTENSION: ICD-10-CM

## 2023-05-30 PROCEDURE — 3044F HG A1C LEVEL LT 7.0%: CPT | Performed by: FAMILY MEDICINE

## 2023-05-30 PROCEDURE — 99214 OFFICE O/P EST MOD 30 MIN: CPT | Performed by: FAMILY MEDICINE

## 2023-05-30 PROCEDURE — 3080F DIAST BP >= 90 MM HG: CPT | Performed by: FAMILY MEDICINE

## 2023-05-30 PROCEDURE — 1160F RVW MEDS BY RX/DR IN RCRD: CPT | Performed by: FAMILY MEDICINE

## 2023-05-30 PROCEDURE — 3074F SYST BP LT 130 MM HG: CPT | Performed by: FAMILY MEDICINE

## 2023-05-30 PROCEDURE — 1159F MED LIST DOCD IN RCRD: CPT | Performed by: FAMILY MEDICINE

## 2023-05-30 RX ORDER — LISINOPRIL 5 MG/1
5 TABLET ORAL DAILY
Qty: 90 TABLET | Refills: 3 | Status: SHIPPED | OUTPATIENT
Start: 2023-05-30

## 2023-05-30 RX ORDER — PRAVASTATIN SODIUM 80 MG/1
80 TABLET ORAL DAILY
Qty: 90 TABLET | Refills: 3 | Status: SHIPPED | OUTPATIENT
Start: 2023-05-30

## 2023-05-30 RX ORDER — METFORMIN HYDROCHLORIDE 750 MG/1
750 TABLET, EXTENDED RELEASE ORAL
Qty: 90 TABLET | Refills: 3 | Status: SHIPPED | OUTPATIENT
Start: 2023-05-30

## 2023-05-30 NOTE — PROGRESS NOTES
"  Subjective   Tim Kelly is a 73 y.o. male who is here for   Chief Complaint   Patient presents with   • Diabetes   • Dizziness     Patient has fell twice due to dizziness   .     History of Present Illness     Tim is here in follow-up.  His mild cognitive decline is stable.  He is still reading.  Try to watch less TV.  He is going to speech therapy at [facility].  No choking no coughing  Still is lightheaded.  We reduced his lisinopril from 20 down to 5 mg last time to avoid orthostatic hypotension.  His diastolic was somewhat elevated today but I think we will tolerate that to prevent falls.  He did have 2 falls since we seen him last night associated with injury.  Last 1 was him getting up off the toilet after BM.  Spoke with his granddaughter we will get the toilet seat handlebars so he can push up and off the toilet.  Family reports Tim had another brain MRI at [facility].  Did not reveal any progression of scar tissue.    Up-to-date with his eye provider on drops for his glaucoma      The following portions of the patient's history were reviewed and updated as appropriate: allergies, current medications, past family history, past medical history, past social history, past surgical history and problem list.    Review of Systems    Objective   Vitals:    05/30/23 1256   BP: 124/96   Pulse: 94   Temp: 98.1 °F (36.7 °C)   SpO2: 97%   Weight: 82.1 kg (181 lb)   Height: 182.9 cm (72.01\")      Physical Exam  Vitals reviewed.   Cardiovascular:      Rate and Rhythm: Normal rate.   Pulmonary:      Effort: Pulmonary effort is normal.   Neurological:      Mental Status: He is alert.         Assessment & Plan   Diagnoses and all orders for this visit:    1. Type 2 diabetes mellitus with hyperglycemia, without long-term current use of insulin (HCC) (Primary)  -     metFORMIN ER (GLUCOPHAGE-XR) 750 MG 24 hr tablet; Take 1 tablet by mouth Daily With Breakfast. Indications: Type 2 Diabetes  Dispense: 90 " tablet; Refill: 3  -     CBC (No Diff); Future  -     Comprehensive Metabolic Panel; Future  -     Hemoglobin A1c; Future    2. Essential hypertension  -     lisinopril (PRINIVIL,ZESTRIL) 5 MG tablet; Take 1 tablet by mouth Daily. Indications: High Blood Pressure Disorder  Dispense: 90 tablet; Refill: 3    3. Mixed hyperlipidemia  -     pravastatin (PRAVACHOL) 80 MG tablet; Take 1 tablet by mouth Daily. Indications: High Amount of Fats in the Blood  Dispense: 90 tablet; Refill: 3    4. Cognitive change    Reviewed labs.  A1c is up somewhat.  He is eating sweets back in the diet.    They are switching all his prescriptions over to a new pharmacy    There are no Patient Instructions on file for this visit.    Medications Discontinued During This Encounter   Medication Reason   • metFORMIN ER (GLUCOPHAGE-XR) 750 MG 24 hr tablet Reorder   • lisinopril (PRINIVIL,ZESTRIL) 5 MG tablet Reorder   • pravastatin (PRAVACHOL) 80 MG tablet Reorder        Return in about 6 months (around 11/30/2023) for diabetes.    Dr. Gagandeep Romero  [facility]  [place], [place]

## 2023-07-08 PROBLEM — R55 SYNCOPE: Status: ACTIVE | Noted: 2023-07-08

## 2023-07-12 PROBLEM — R06.83 SNORING: Status: ACTIVE | Noted: 2023-07-12

## 2023-07-28 ENCOUNTER — OFFICE VISIT (OUTPATIENT)
Dept: FAMILY MEDICINE CLINIC | Facility: CLINIC | Age: 73
End: 2023-07-28
Payer: MEDICARE

## 2023-07-28 VITALS
BODY MASS INDEX: 23.33 KG/M2 | WEIGHT: 176 LBS | TEMPERATURE: 97.5 F | DIASTOLIC BLOOD PRESSURE: 82 MMHG | SYSTOLIC BLOOD PRESSURE: 112 MMHG | OXYGEN SATURATION: 97 % | HEART RATE: 80 BPM | HEIGHT: 73 IN

## 2023-07-28 DIAGNOSIS — Z09 HOSPITAL DISCHARGE FOLLOW-UP: ICD-10-CM

## 2023-07-28 DIAGNOSIS — I44.2 COMPLETE HEART BLOCK: Primary | ICD-10-CM

## 2023-07-28 DIAGNOSIS — R41.89 COGNITIVE CHANGE: ICD-10-CM

## 2023-07-28 PROBLEM — R55 SYNCOPE: Status: RESOLVED | Noted: 2023-07-08 | Resolved: 2023-07-28

## 2023-07-28 PROBLEM — R55 SYNCOPE AND COLLAPSE: Status: RESOLVED | Noted: 2023-03-03 | Resolved: 2023-07-28

## 2023-07-28 RX ORDER — DONEPEZIL HYDROCHLORIDE 10 MG/1
10 TABLET, FILM COATED ORAL NIGHTLY
Qty: 90 TABLET | Refills: 1 | Status: SHIPPED | OUTPATIENT
Start: 2023-07-28

## 2023-07-28 NOTE — PROGRESS NOTES
"  Subjective   Tim Kelly is a 73 y.o. male who is here for   Chief Complaint   Patient presents with    Hospital Follow Up Visit     fainting   .     History of Present Illness     Tim is here in hospital follow-up.  I last saw him on July 12.  He has been dealing with lightheadedness and syncopal issues for nearly 2 years.  Later that day he was back over at [facility] physical [facility] which she was receiving for his cognitive decline and falling.  He had a syncopal episode there and heart monitoring revealed a complete heart block.  He was taken from physical therapy department to the emergency room.  He was admitted seen by the cardiology staff and a pacemaker was placed.  Today he is feeling much better.  His right groin has healed up.  No change in medications.  He and I and his daughter reviewed most of the [facility] cardiology records.  He has upcoming appointment with [facility] cardiology  He will see sleep medicine on August 11  Probably no need to continue to see [facility] cardiology, as he will be seeing [facility] going forward  He also has an ENT appointment for his speech.  And continues seeing [facility] neurology for his overall cognitive decline    The following portions of the patient's history were reviewed and updated as appropriate: allergies, current medications, past medical history, past social history, past surgical history, and problem list.    Review of Systems    Objective   Vitals:    07/28/23 0750   BP: 112/82   Pulse: 80   Temp: 97.5 °F (36.4 °C)   SpO2: 97%   Weight: 79.8 kg (176 lb)   Height: 185.4 cm (72.99\")      Physical Exam  Vitals reviewed.   Cardiovascular:      Rate and Rhythm: Normal rate.   Pulmonary:      Effort: Pulmonary effort is normal.   Neurological:      Mental Status: He is alert.      Cranial Nerves: No dysarthria.   Psychiatric:         Speech: Speech is delayed.     Current outpatient and discharge medications have been reconciled for the " patient.  Reviewed by: Gagandeep Romero MD    Assessment & Plan   Diagnoses and all orders for this visit:    1. Complete heart block (Primary)    2. Hospital discharge follow-up    3. Cognitive change  -     donepezil (ARICEPT) 10 MG tablet; Take 1 tablet by mouth Every Night.  Dispense: 90 tablet; Refill: 1    Reviewed his recent hospital stay for complete heart block pacemaker has been inserted  No change in cardiac medications.  Daughter is asking to renew the Aricept at the 10 mg.  Initially on 5 mg then neurology ask him to increase to 10 mg.  He has upcoming appointments.  We will see him in November    There are no Patient Instructions on file for this visit.    Medications Discontinued During This Encounter   Medication Reason    donepezil (ARICEPT) 5 MG tablet Reorder        No follow-ups on file.    Dr. Gagandeep Romero  [place], [place]

## 2023-08-11 ENCOUNTER — OFFICE VISIT (OUTPATIENT)
Dept: SLEEP MEDICINE | Facility: HOSPITAL | Age: 73
End: 2023-08-11
Payer: MEDICARE

## 2023-08-11 VITALS
WEIGHT: 178 LBS | DIASTOLIC BLOOD PRESSURE: 78 MMHG | HEIGHT: 73 IN | OXYGEN SATURATION: 97 % | SYSTOLIC BLOOD PRESSURE: 134 MMHG | BODY MASS INDEX: 23.59 KG/M2 | HEART RATE: 70 BPM

## 2023-08-11 DIAGNOSIS — G47.8 NON-RESTORATIVE SLEEP: ICD-10-CM

## 2023-08-11 DIAGNOSIS — Z95.0 PACEMAKER: ICD-10-CM

## 2023-08-11 DIAGNOSIS — R06.83 SNORING: Primary | ICD-10-CM

## 2023-08-11 DIAGNOSIS — G31.84 MILD COGNITIVE IMPAIRMENT: ICD-10-CM

## 2023-08-11 DIAGNOSIS — G47.10 HYPERSOMNIA: ICD-10-CM

## 2023-08-11 PROCEDURE — G0463 HOSPITAL OUTPT CLINIC VISIT: HCPCS

## 2023-08-11 NOTE — PROGRESS NOTES
[facility] New Patient/Consultation       Reason for Consultation: Sleep disturbance      Patient Care Team:  Gagandeep Romero MD as PCP - General (Family Medicine)  Zachariah Nguyen OD (Optometry)  Aaliyah Lew DO (Neurology)  Ludin Bowling MD as Consulting Physician (Cardiac Electrophysiology)  Penny Obregon MD as Consulting Physician (Sleep Medicine)      History of present illness:  Thank you for asking me to see your patient.  The patient is a 73 y.o. male presents today with his son with type 2 diabetes mellitus dementia pacemaker in place presents with concern for sleep disorder.  No history of prior sleep study or tonsillectomy.  Bedtime 7:30 PM wake time 7 AM sleeps around 12 hours sleep latency 2 minutes 1 nap per day no rotating shifts.  Patient reports hypersomnia nonrestorative sleep snoring tooth grinding during sleep.  BMI 23.5.      Social History: No tobacco use 1-2 alcoholic drinks per week 2 coffees a day    Allergies:  Patient has no known allergies.    Family History: CHELLY no       Current Outpatient Medications:     aspirin 81 MG chewable tablet, Chew Daily., Disp: , Rfl:     brimonidine (ALPHAGAN) 0.2 % ophthalmic solution, Administer  to both eyes., Disp: , Rfl:     cholecalciferol (VITAMIN D3) 25 MCG (1000 UT) tablet, Take 1 tablet by mouth Daily., Disp: , Rfl:     donepezil (ARICEPT) 10 MG tablet, Take 1 tablet by mouth Every Night., Disp: 90 tablet, Rfl: 1    lisinopril (PRINIVIL,ZESTRIL) 5 MG tablet, Take 1 tablet by mouth Daily. Indications: High Blood Pressure Disorder, Disp: 90 tablet, Rfl: 3    metFORMIN ER (GLUCOPHAGE-XR) 750 MG 24 hr tablet, Take 1 tablet by mouth Daily With Breakfast. Indications: Type 2 Diabetes, Disp: 90 tablet, Rfl: 3    pravastatin (PRAVACHOL) 80 MG tablet, Take 1 tablet by mouth Daily. Indications: High Amount of Fats in the Blood, Disp: 90 tablet, Rfl: 3    Vital Signs:    Vitals:    08/11/23 0900   BP: 134/78   Pulse: 70   SpO2:  "97%   Weight: 80.7 kg (178 lb)   Height: 185.4 cm (73\")      Body mass index is 23.48 kg/mý.  Neck Circumference: 16 inches      REVIEW OF SYSTEMS.  Full review of systems available on the intake form which is scanned in the media tab.  The relevant positive are noted below  Daytime excessive sleepiness with Olyphant Sleepiness Scale :Total score: 9   Snoring  Irregular heartbeat      Physical exam:  Vitals:    08/11/23 0900   BP: 134/78   Pulse: 70   SpO2: 97%   Weight: 80.7 kg (178 lb)   Height: 185.4 cm (73\")    Body mass index is 23.48 kg/mý. Neck Circumference: 16 inches  HEENT: Head is atraumatic, normocephalic  Eyes: pupils are round equal and reacting to light and accommodation, conjunctiva normal  NECK:Neck Circumference: 16 inches  RESPIRATORY SYSTEM: Regular respirations  CARDIOVASULAR SYSTEM: Regular rate  EXTREMITES: No cyanosis, clubbing  NEUROLOGICAL SYSTEM: Oriented x 3, no gross motor defects, gait normal      Impression:  1. Snoring    2. Hypersomnia    3. Non-restorative sleep    4. Pacemaker    5. Mild cognitive impairment        Plan:    Good sleep hygiene measures should be maintained.      I discussed the pathophysiology of obstructive sleep apnea with the patient.  We discussed the adverse outcomes associated with untreated sleep-disordered breathing.  We discussed treatment modalities of obstructive sleep apnea including CPAP device. Sleep study will be scheduled to establish definitive diagnosis of sleep disorder breathing. Caution during activities that require prolonged concentration is strongly advised.  Patient will be notified of sleep study results after sleep study is completed. The patient is not opposed to treatment with CPAP device if we confirm significant obstructive sleep apnea on polysomnography.    If negative continue care per primary care.    Thank you for allowing me to participate in your patient's care.    Penny Obregon MD  Sleep Medicine  08/11/23  09:33 EDT      "

## 2023-08-18 ENCOUNTER — TELEPHONE (OUTPATIENT)
Dept: SLEEP MEDICINE | Facility: HOSPITAL | Age: 73
End: 2023-08-18
Payer: MEDICARE

## 2023-08-21 DIAGNOSIS — G47.33 OBSTRUCTIVE SLEEP APNEA, ADULT: Primary | ICD-10-CM

## 2023-08-22 DIAGNOSIS — E11.65 TYPE 2 DIABETES MELLITUS WITH HYPERGLYCEMIA, WITHOUT LONG-TERM CURRENT USE OF INSULIN: ICD-10-CM

## 2023-08-22 RX ORDER — METFORMIN HYDROCHLORIDE 750 MG/1
TABLET, EXTENDED RELEASE ORAL
Qty: 90 TABLET | Refills: 3 | OUTPATIENT
Start: 2023-08-22

## 2023-08-29 ENCOUNTER — TELEPHONE (OUTPATIENT)
Dept: SLEEP MEDICINE | Facility: HOSPITAL | Age: 73
End: 2023-08-29
Payer: MEDICARE

## 2023-12-08 ENCOUNTER — OFFICE VISIT (OUTPATIENT)
Dept: FAMILY MEDICINE CLINIC | Facility: CLINIC | Age: 73
End: 2023-12-08
Payer: MEDICARE

## 2023-12-08 VITALS
SYSTOLIC BLOOD PRESSURE: 118 MMHG | HEART RATE: 79 BPM | WEIGHT: 181 LBS | HEIGHT: 73 IN | OXYGEN SATURATION: 98 % | DIASTOLIC BLOOD PRESSURE: 78 MMHG | TEMPERATURE: 98.2 F | BODY MASS INDEX: 23.99 KG/M2

## 2023-12-08 DIAGNOSIS — R41.89 COGNITIVE CHANGE: ICD-10-CM

## 2023-12-08 DIAGNOSIS — Z12.5 SCREENING FOR PROSTATE CANCER: ICD-10-CM

## 2023-12-08 DIAGNOSIS — E11.65 TYPE 2 DIABETES MELLITUS WITH HYPERGLYCEMIA, WITHOUT LONG-TERM CURRENT USE OF INSULIN: ICD-10-CM

## 2023-12-08 DIAGNOSIS — Z87.891 PERSONAL HISTORY OF TOBACCO USE, PRESENTING HAZARDS TO HEALTH: ICD-10-CM

## 2023-12-08 DIAGNOSIS — Z00.00 MEDICARE ANNUAL WELLNESS VISIT, SUBSEQUENT: Primary | ICD-10-CM

## 2023-12-08 RX ORDER — DONEPEZIL HYDROCHLORIDE 10 MG/1
20 TABLET, FILM COATED ORAL NIGHTLY
Qty: 180 TABLET | Refills: 3 | Status: SHIPPED | OUTPATIENT
Start: 2023-12-08

## 2023-12-08 NOTE — PROGRESS NOTES
The ABCs of the Annual Wellness Visit  Subsequent Medicare Wellness Visit    Subjective      Tim Kelly is a 73 y.o. male who presents for a Subsequent Medicare Wellness Visit.    The following portions of the patient's history were reviewed and   updated as appropriate: allergies, current medications, past medical history, past social history, past surgical history, and problem list.    Compared to one year ago, the patient feels his physical   health is worse.    Compared to one year ago, the patient feels his mental   health is worse.    Recent Hospitalizations:  This patient has had a [facility] admission record on file within the last 365 days.    Current Medical Providers:  Patient Care Team:  Gagandeep Romero MD as PCP - General (Family Medicine)  Zachariah Nguyen OD (Optometry)  Aaliyah Lew DO (Neurology)  Ludin Bowling MD as Consulting Physician (Cardiac Electrophysiology)    Outpatient Medications Prior to Visit   Medication Sig Dispense Refill    aspirin 81 MG chewable tablet Chew Daily.      brimonidine (ALPHAGAN) 0.2 % ophthalmic solution Administer  to both eyes.      cholecalciferol (VITAMIN D3) 25 MCG (1000 UT) tablet Take 1 tablet by mouth Daily.      lisinopril (PRINIVIL,ZESTRIL) 5 MG tablet Take 1 tablet by mouth Daily. Indications: High Blood Pressure Disorder 90 tablet 3    metFORMIN ER (GLUCOPHAGE-XR) 750 MG 24 hr tablet Take 1 tablet by mouth Daily With Breakfast. Indications: Type 2 Diabetes 90 tablet 3    pravastatin (PRAVACHOL) 80 MG tablet Take 1 tablet by mouth Daily. Indications: High Amount of Fats in the Blood 90 tablet 3    donepezil (ARICEPT) 10 MG tablet Take 1 tablet by mouth Every Night. 90 tablet 1     No facility-administered medications prior to visit.       No opioid medication identified on active medication list. I have reviewed chart for other potential  high risk medication/s and harmful drug interactions in the elderly.        Aspirin is on  "active medication list. Aspirin use is indicated based on review of current medical condition/s. Pros and cons of this therapy have been discussed today. Benefits of this medication outweigh potential harm.  Patient has been encouraged to continue taking this medication.  .      Patient Active Problem List   Diagnosis    Mixed hyperlipidemia    Essential hypertension    Screening for prostate cancer    Current smoker    Medication monitoring encounter    Elevated serum creatinine    Medicare annual wellness visit, subsequent    Type 2 diabetes mellitus with hyperglycemia, without long-term current use of insulin    Elevated ALT measurement    Hypertriglyceridemia    Encounter for screening for lung cancer    Vertigo    Cognitive change    Small vessel disease, cerebrovascular    Primary open angle glaucoma (POAG) of both eyes, mild stage    Snoring    Complete heart block     Advance Care Planning   Advance Care Planning     Advance Directive is not on file.  ACP discussion was held with the patient during this visit. Patient has an advance directive (not in EMR), copy requested.     Objective    Vitals:    23 1249   BP: 118/78   Pulse: 79   Temp: 98.2 °F (36.8 °C)   SpO2: 98%   Weight: 82.1 kg (181 lb)   Height: 185.4 cm (72.99\")     Estimated body mass index is 23.89 kg/m² as calculated from the following:    Height as of this encounter: 185.4 cm (72.99\").    Weight as of this encounter: 82.1 kg (181 lb).    BMI is within normal parameters. No other follow-up for BMI required.      Does the patient have evidence of cognitive impairment?   Yes: Current medication dosing adjusted.            HEALTH RISK ASSESSMENT    Smoking Status:  Social History     Tobacco Use   Smoking Status Former    Packs/day: 1.00    Years: 55.00    Additional pack years: 0.00    Total pack years: 55.00    Types: Cigarettes    Quit date: 2022    Years since quittin.8   Smokeless Tobacco Never   Tobacco Comments    started as " a teenager     Alcohol Consumption:  Social History     Substance and Sexual Activity   Alcohol Use Not Currently    Alcohol/week: 7.0 standard drinks of alcohol    Types: 7 Glasses of wine per week    Comment: 1 a day     Fall Risk Screen:    NORMAN Fall Risk Assessment was completed, and patient is at HIGH risk for falls. Assessment completed on:2023    Depression Screenin/8/2023    12:53 PM   PHQ-2/PHQ-9 Depression Screening   Little Interest or Pleasure in Doing Things 0-->not at all   Feeling Down, Depressed or Hopeless 0-->not at all   PHQ-9: Brief Depression Severity Measure Score 0       Health Habits and Functional and Cognitive Screenin/8/2023    12:52 PM   Functional & Cognitive Status   Do you have difficulty preparing food and eating? No   Do you have difficulty bathing yourself, getting dressed or grooming yourself? No   Do you have difficulty using the toilet? No   Do you have difficulty moving around from place to place? No   Do you have trouble with steps or getting out of a bed or a chair? No   Current Diet Well Balanced Diet   Dental Exam Up to date   Eye Exam Up to date   Exercise (times per week) 7 times per week   Current Exercises Include Home Exercise Program (TV, Computer, Etc.)   Do you need help using the phone?  No   Are you deaf or do you have serious difficulty hearing?  No   Do you need help to go to places out of walking distance? No   Do you need help shopping? No   Do you need help preparing meals?  No   Do you need help with housework?  No   Do you need help with laundry? No   Do you need help taking your medications? No   Do you need help managing money? No   Do you ever drive or ride in a car without wearing a seat belt? No       Age-appropriate Screening Schedule:  Refer to the list below for future screening recommendations based on patient's age, sex and/or medical conditions. Orders for these recommended tests are listed in the plan section. The  patient has been provided with a written plan.    Health Maintenance   Topic Date Due    HEPATITIS C SCREENING  Never done    DIABETIC FOOT EXAM  Never done    AAA SCREEN (ONE-TIME)  Never done    ZOSTER VACCINE (2 of 3) 08/10/2016    DIABETIC EYE EXAM  06/11/2022    LUNG CANCER SCREENING  09/08/2022    ANNUAL WELLNESS VISIT  08/30/2023    HEMOGLOBIN A1C  11/23/2023    COVID-19 Vaccine (7 - 2023-24 season) 12/14/2023    LIPID PANEL  05/23/2024    URINE MICROALBUMIN  05/23/2024    COLORECTAL CANCER SCREENING  02/10/2030    TDAP/TD VACCINES (2 - Td or Tdap) 07/08/2033    INFLUENZA VACCINE  Completed    Pneumococcal Vaccine 65+  Completed                  CMS Preventative Services Quick Reference  Risk Factors Identified During Encounter:    Fall Risk-High or Moderate: Discussed Fall Prevention in the home    The above risks/problems have been discussed with the patient.  Pertinent information has been shared with the patient in the After Visit Summary.    Diagnoses and all orders for this visit:    1. Medicare annual wellness visit, subsequent (Primary)    2. Type 2 diabetes mellitus with hyperglycemia, without long-term current use of insulin  -     MicroAlbumin, Urine, Random - Urine, Clean Catch  -     Basic Metabolic Panel  -     LDL Cholesterol, Direct  -     Hemoglobin A1c  -     TSH Rfx On Abnormal To Free T4    3. Screening for prostate cancer  -     PSA Screen    4. Cognitive change  -     donepezil (ARICEPT) 10 MG tablet; Take 2 tablets by mouth Every Night. Indications: Alzheimer's Disease  Dispense: 180 tablet; Refill: 3    5. Personal history of tobacco use, presenting hazards to health  -     CT chest low dose wo; Future    Tim overall doing okay.  Brought in by son today who is very pleasant.  As Tim is no longer able to drive a car  Has his pacemaker in place.  He is up-to-date with cardiology    He is still quite unsteady on his feet gets chronic dizziness and vertigo    Labs are  okrussell    Pressure well-controlled    Home blood sugars in the low 100s    Lets double his Aricept to 20 a day    Lung cancer screening due    Son reports that Tim is up-to-date with a dentist and optometry    Follow Up:   Next Medicare Wellness visit to be scheduled in 1 year.      An After Visit Summary and PPPS were made available to the patient.

## 2023-12-08 NOTE — PROGRESS NOTES
" Low-Dose Lung Cancer CT Screening Visit    CHIEF COMPLAINT:    Shared Decision Making  I am discussing tobacco cessation today with Tim Kelly    SMOKING HISTORY:     Social History     Tobacco Use   Smoking Status Former    Packs/day: 1.00    Years: 55.00    Additional pack years: 0.00    Total pack years: 55.00    Types: Cigarettes    Quit date: 2022    Years since quittin.8   Smokeless Tobacco Never   Tobacco Comments    started as a teenager       SUBJECTIVE:     Tim Kelly is a former smoker quitting 2 years ago with a  50  pack year history.  he reports no use of alternate forms of tobacco, electronic cigarettes, marijuana or other substances.  Based on the recommendation of the [place] Preventive Services Task Force, this patient is at high risk for lung cancer and a low-dose CT screening scan is recommended.     The patient has had no hemoptysis, unintentional weight loss or increasing shortness of breath. The patient is asymptomatic and has no signs or symptoms of lung cancer.     Together we discussed the potential benefits and potential harms of being screened for lung cancer including the potential for follow up diagnostic testing, risk for over diagnosis, false positive rate and radiation exposure using the [facility] Lung Cancer Screening Shared Decision-Making Tool. A copy of this decision aid resource has been provided in the after visit summary.  We also reviewed the patient's smoking history and counseled him on the importance and health benefits of maintaining cigarette smoking abstinence.      OBJECTIVE:    /78   Pulse 79   Temp 98.2 °F (36.8 °C)   Ht 185.4 cm (72.99\")   Wt 82.1 kg (181 lb)   SpO2 98%   BMI 23.89 kg/m²   General: no distress, alert and oriented  Chest: Lung sounds are clear to auscultation, no wheezes, rales or rhonchi, with symmetric air entry. No respiratory distress  Cardiovascular: RRR with no murmur auscultated      Continued " Smoking Abstinence discussion:     We discussed that there are a number of resources and interventions to assist with smoking cessation if needed in the future.   On a scale of zero to ten, the patient rates their motivation to stay quit at a 10 out of 10 today.  The patient is confident that they will never smoke in the future.    Recommendations for continued lung cancer screening:      We discussed the NCCN guidelines for lung cancer screening and the patient verbalized understanding that annual screening is recommended until fifteen years beyond smoking as long as they have no other disease or comorbidity that would prevent them from receiving cancer treatments such as surgery should a lung cancer be detected.  After review of the NCCN guidelines and recommendations for ongoing screening, the patient verbalized understanding of recommendations for follow-up.  The patient has decided to proceed with a Low Dose Lung Cancer Screening CT today.       5minutes face-to-face spent counseling patient on the continued health benefits of having quit tobacco, maintaining smoking abstinence, smoking cessation strategies and resources, as well as the importance of adherence to annual lung cancer low-dose CT screening.

## 2023-12-09 LAB
BUN SERPL-MCNC: 23 MG/DL (ref 8–23)
BUN/CREAT SERPL: 16.8 (ref 7–25)
CALCIUM SERPL-MCNC: 10.1 MG/DL (ref 8.6–10.5)
CHLORIDE SERPL-SCNC: 101 MMOL/L (ref 98–107)
CO2 SERPL-SCNC: 28 MMOL/L (ref 22–29)
CREAT SERPL-MCNC: 1.37 MG/DL (ref 0.76–1.27)
EGFRCR SERPLBLD CKD-EPI 2021: 54.5 ML/MIN/1.73
GLUCOSE SERPL-MCNC: 134 MG/DL (ref 65–99)
HBA1C MFR BLD: 6.3 % (ref 4.8–5.6)
LDLC SERPL DIRECT ASSAY-MCNC: 75 MG/DL (ref 0–100)
MICROALBUMIN UR-MCNC: 7.7 UG/ML
POTASSIUM SERPL-SCNC: 4.2 MMOL/L (ref 3.5–5.2)
PSA SERPL-MCNC: 0.55 NG/ML (ref 0–4)
SODIUM SERPL-SCNC: 141 MMOL/L (ref 136–145)
TSH SERPL DL<=0.005 MIU/L-ACNC: 2.2 UIU/ML (ref 0.27–4.2)

## 2023-12-12 ENCOUNTER — TELEPHONE (OUTPATIENT)
Dept: FAMILY MEDICINE CLINIC | Facility: CLINIC | Age: 73
End: 2023-12-12

## 2023-12-12 NOTE — TELEPHONE ENCOUNTER
PT's daughter (Libertad) called and stated that she missed a call from someone regarding her father's prescription drug coverage information. She was unsure who called.

## 2023-12-12 NOTE — TELEPHONE ENCOUNTER
PATIENT'S DAUGHTER CHRIS CALLED REQUESTING A WALKER. HE FELL 12/8/23 BROKE HIS LEFT HAND. HE SEEMS TO BE OFF BALANCE AND STILL HAVING SOME DIZZINESS, THIS IN ONGOING    [facility]    CALL BACK NUMBER [phone]

## 2024-01-25 ENCOUNTER — HOSPITAL ENCOUNTER (OUTPATIENT)
Dept: CT IMAGING | Facility: HOSPITAL | Age: 74
Discharge: HOME | End: 2024-01-25
Admitting: FAMILY MEDICINE
Payer: MEDICARE

## 2024-01-25 DIAGNOSIS — Z87.891 PERSONAL HISTORY OF TOBACCO USE, PRESENTING HAZARDS TO HEALTH: ICD-10-CM

## 2024-01-25 PROCEDURE — 71271 CT THORAX LUNG CANCER SCR C-: CPT

## 2024-03-19 ENCOUNTER — TELEPHONE (OUTPATIENT)
Dept: FAMILY MEDICINE CLINIC | Facility: CLINIC | Age: 74
End: 2024-03-19

## 2024-03-19 NOTE — TELEPHONE ENCOUNTER
Caller: Kaila Kelly (Medical POA)    Relationship: Emergency Contact    Best call back number: [phone]    What form or medical record are you requesting: MEDICATION LIST    Who is requesting this form or medical record from you: PERSONAL RECORD    How would you like to receive the form or medical records (pick-up, mail, fax): MAIL  If mail, what is the [facility] [address] [place] [address]    Timeframe paperwork needed: AS SOON AS AVAILABLE    Additional notes: STATED THAT THEY ARE NEEDING A FULL LIST OF THE PATIENTS MEDICATION FOR THEIR RECORDS. PLEASE CALL AND ADVISE WHEN SENT

## 2024-03-19 NOTE — TELEPHONE ENCOUNTER
Spoke with patients daughter. She will be getting proxy access to her fathers [employer] and will obtain what she is needing from there. Will call our office if anything else is needed.

## 2024-03-27 ENCOUNTER — TELEPHONE (OUTPATIENT)
Dept: FAMILY MEDICINE CLINIC | Facility: CLINIC | Age: 74
End: 2024-03-27
Payer: MEDICARE

## 2024-03-27 NOTE — TELEPHONE ENCOUNTER
Kaila Kelly called, daughter of Tim, and stated that her father in now in an assisted living home and they are asking for an H&P and a medication list. Kaila is asking for this information to be faxed to the attention of Ruma Butt, fax #: [phone].   Please advise.

## 2024-04-09 ENCOUNTER — TELEMEDICINE (OUTPATIENT)
Dept: FAMILY MEDICINE CLINIC | Facility: CLINIC | Age: 74
End: 2024-04-09
Payer: MEDICARE

## 2024-04-09 DIAGNOSIS — R41.89 COGNITIVE CHANGE: ICD-10-CM

## 2024-04-09 DIAGNOSIS — I10 ESSENTIAL HYPERTENSION: ICD-10-CM

## 2024-04-09 PROBLEM — R79.89 ELEVATED SERUM CREATININE: Status: RESOLVED | Noted: 2017-11-27 | Resolved: 2024-04-09

## 2024-04-09 PROBLEM — G47.33 OBSTRUCTIVE SLEEP APNEA SYNDROME: Status: ACTIVE | Noted: 2023-07-12

## 2024-04-09 PROBLEM — R74.01 ELEVATED ALT MEASUREMENT: Status: RESOLVED | Noted: 2018-06-18 | Resolved: 2024-04-09

## 2024-04-09 PROCEDURE — 1159F MED LIST DOCD IN RCRD: CPT | Performed by: FAMILY MEDICINE

## 2024-04-09 PROCEDURE — 1160F RVW MEDS BY RX/DR IN RCRD: CPT | Performed by: FAMILY MEDICINE

## 2024-04-09 PROCEDURE — 99213 OFFICE O/P EST LOW 20 MIN: CPT | Performed by: FAMILY MEDICINE

## 2024-04-09 RX ORDER — DONEPEZIL HYDROCHLORIDE 10 MG/1
10 TABLET, FILM COATED ORAL NIGHTLY
Qty: 90 TABLET | Refills: 3 | Status: SHIPPED
Start: 2024-04-09

## 2024-04-09 RX ORDER — LISINOPRIL 10 MG/1
10 TABLET ORAL DAILY
Qty: 90 TABLET | Refills: 0 | Status: SHIPPED | OUTPATIENT
Start: 2024-04-09

## 2024-04-09 RX ORDER — CHOLECALCIFEROL (VITAMIN D3) 125 MCG
5 CAPSULE ORAL NIGHTLY
COMMUNITY

## 2024-04-09 NOTE — PROGRESS NOTES
Subjective   Tim Kelly is a 74 y.o. male who is here for   Chief Complaint   Patient presents with    Dementia   .     Dementia       This is a Mychart Video medical encounter.  Video visit is being chosen because either patient has an acute contagious infection or is unable to physically come to the office due to medical issues or transportation issues.  Medical history from chart is reviewed. Audio visual encounter provided through a secure link via [employer]. Patient location is patient's home. Provider location is in my routine medical office in [facility]. Patient has given prior consent for this encounter, via check in process. Regarding EM coding: history will not be as robust and exam will be limited. Most EM coding decisions will be based on MDM and time.  Total face video time, 15 minutes . Total chart time pre and post chartin-20 minutes.  As Tim is not able to drive a car anymore    Tim will be moving into a personal care apartment.  Over at the [facility] in [facility].  Moving today is this weekend.  Will fill out his medical entry form today.  I believe this is a good decision by he and the family.    Update his medication list today.  The following portions of the patient's history were reviewed and updated as appropriate: allergies, current medications, past family history, past medical history, past social history, past surgical history, and problem list.    Review of Systems    Objective   There were no vitals filed for this visit.   Physical Exam  Vitals reviewed.   Neurological:      Mental Status: He is alert.         Assessment & Plan   Diagnoses and all orders for this visit:    1. Essential hypertension  -     lisinopril (PRINIVIL,ZESTRIL) 10 MG tablet; Take 1 tablet by mouth Daily. Indications: High Blood Pressure Disorder  Dispense: 90 tablet; Refill: 0    2. Cognitive change  -     donepezil (ARICEPT) 10 MG tablet; Take 1 tablet by mouth Every Night. Indications:  Alzheimer's Disease  Dispense: 90 tablet; Refill: 3    allCLEAR to move into personal care living at the [facility]  There are no Patient Instructions on file for this visit.    Medications Discontinued During This Encounter   Medication Reason    lisinopril (PRINIVIL,ZESTRIL) 5 MG tablet Reorder    donepezil (ARICEPT) 10 MG tablet         No follow-ups on file.    Dr. Gagandeep Romero  [place], [place]

## 2024-04-11 ENCOUNTER — TELEPHONE (OUTPATIENT)
Dept: FAMILY MEDICINE CLINIC | Facility: CLINIC | Age: 74
End: 2024-04-11
Payer: MEDICARE

## 2024-04-11 DIAGNOSIS — R29.6 FALLS FREQUENTLY: ICD-10-CM

## 2024-04-11 DIAGNOSIS — R41.89 COGNITIVE CHANGE: Primary | ICD-10-CM

## 2024-04-11 NOTE — ADDENDUM NOTE
Addended by: MANUEL ODONNELL on: 4/11/2024 12:05 PM     Modules accepted: Orders     Physical Therapy        Physical Therapy Cancel Note      DATE: 2021    NAME: Amy Shore  MRN: [id number]   : 1967      Patient not seen this date for Physical Therapy due to: Attempted to see the patient at ~ 09:38 A:M  Other: Patient refusal   -   Patient refuses therapy evaluations multiple times. OT and PT provided thorough education regarding importance of therapy evaluation due to patient's presentation with stroke-like symptoms. Pt continues to refuse, waves OT and PT away, and states \"this is what a refusal looks like. \" KATIE Montiel aware.        Electronically signed by Fanny Quijano PT on 2021 at 11:40 AM'

## 2024-04-11 NOTE — TELEPHONE ENCOUNTER
Ria from [facility] called and is asking for the patient's demographics, last office visit note and PT orders, since the patient is moving into [facility] senior living home. Ria's phone #: [phone] and the fax #: [phone].   Please advise.

## 2024-04-15 ENCOUNTER — TELEPHONE (OUTPATIENT)
Dept: FAMILY MEDICINE CLINIC | Facility: CLINIC | Age: 74
End: 2024-04-15
Payer: MEDICARE

## 2024-04-15 DIAGNOSIS — R41.89 COGNITIVE CHANGE: Primary | ICD-10-CM

## 2024-05-06 DIAGNOSIS — I10 ESSENTIAL HYPERTENSION: ICD-10-CM

## 2024-05-06 DIAGNOSIS — E11.65 TYPE 2 DIABETES MELLITUS WITH HYPERGLYCEMIA, WITHOUT LONG-TERM CURRENT USE OF INSULIN: Primary | ICD-10-CM

## 2024-05-06 DIAGNOSIS — E55.9 VITAMIN D DEFICIENCY: ICD-10-CM

## 2024-05-06 DIAGNOSIS — R41.89 COGNITIVE CHANGE: ICD-10-CM

## 2024-05-06 DIAGNOSIS — E78.2 MIXED HYPERLIPIDEMIA: ICD-10-CM

## 2024-05-17 ENCOUNTER — OFFICE VISIT (OUTPATIENT)
Dept: FAMILY MEDICINE CLINIC | Facility: CLINIC | Age: 74
End: 2024-05-17
Payer: MEDICARE

## 2024-05-17 VITALS
OXYGEN SATURATION: 99 % | BODY MASS INDEX: 23.33 KG/M2 | HEART RATE: 75 BPM | SYSTOLIC BLOOD PRESSURE: 120 MMHG | TEMPERATURE: 97.2 F | WEIGHT: 176 LBS | DIASTOLIC BLOOD PRESSURE: 80 MMHG | HEIGHT: 73 IN

## 2024-05-17 DIAGNOSIS — R41.89 COGNITIVE CHANGE: Primary | ICD-10-CM

## 2024-05-17 DIAGNOSIS — E78.2 MIXED HYPERLIPIDEMIA: ICD-10-CM

## 2024-05-17 DIAGNOSIS — E11.9 TYPE 2 DIABETES MELLITUS WITHOUT COMPLICATION, WITHOUT LONG-TERM CURRENT USE OF INSULIN: ICD-10-CM

## 2024-05-17 DIAGNOSIS — Z12.5 SCREENING FOR PROSTATE CANCER: ICD-10-CM

## 2024-05-17 PROBLEM — R47.82 FLUENCY DISORDER ASSOCIATED WITH UNDERLYING DISEASE: Status: ACTIVE | Noted: 2024-05-17

## 2024-05-17 PROBLEM — E11.65 TYPE 2 DIABETES MELLITUS WITH HYPERGLYCEMIA, WITHOUT LONG-TERM CURRENT USE OF INSULIN: Chronic | Status: ACTIVE | Noted: 2018-06-18

## 2024-05-17 PROCEDURE — 3074F SYST BP LT 130 MM HG: CPT | Performed by: FAMILY MEDICINE

## 2024-05-17 PROCEDURE — 1126F AMNT PAIN NOTED NONE PRSNT: CPT | Performed by: FAMILY MEDICINE

## 2024-05-17 PROCEDURE — 3044F HG A1C LEVEL LT 7.0%: CPT | Performed by: FAMILY MEDICINE

## 2024-05-17 PROCEDURE — 3079F DIAST BP 80-89 MM HG: CPT | Performed by: FAMILY MEDICINE

## 2024-05-17 PROCEDURE — 99214 OFFICE O/P EST MOD 30 MIN: CPT | Performed by: FAMILY MEDICINE

## 2024-05-17 PROCEDURE — 1160F RVW MEDS BY RX/DR IN RCRD: CPT | Performed by: FAMILY MEDICINE

## 2024-05-17 PROCEDURE — 1159F MED LIST DOCD IN RCRD: CPT | Performed by: FAMILY MEDICINE

## 2024-05-17 RX ORDER — METFORMIN HYDROCHLORIDE 750 MG/1
750 TABLET, EXTENDED RELEASE ORAL
Qty: 90 TABLET | Refills: 3 | Status: SHIPPED | OUTPATIENT
Start: 2024-05-17

## 2024-05-17 RX ORDER — PRAVASTATIN SODIUM 80 MG/1
80 TABLET ORAL DAILY
Qty: 90 TABLET | Refills: 3 | Status: SHIPPED | OUTPATIENT
Start: 2024-05-17

## 2024-05-17 NOTE — PATIENT INSTRUCTIONS
Results for orders placed or performed in visit on 05/10/24   Lipid Panel    Specimen: Blood   Result Value Ref Range    Total Cholesterol 166 0 - 200 mg/dL    Triglycerides 168 (H) 0 - 150 mg/dL    HDL Cholesterol 48 40 - 60 mg/dL    VLDL Cholesterol Vick 29 5 - 40 mg/dL    LDL Chol Calc (NIH) 89 0 - 100 mg/dL   Basic Metabolic Panel    Specimen: Blood   Result Value Ref Range    Glucose 125 (H) 65 - 99 mg/dL    BUN 19 8 - 23 mg/dL    Creatinine 1.28 (H) 0.76 - 1.27 mg/dL    EGFR Result 58.7 (L) >60.0 mL/min/1.73    BUN/Creatinine Ratio 14.8 7.0 - 25.0    Sodium 139 136 - 145 mmol/L    Potassium 4.7 3.5 - 5.2 mmol/L    Chloride 99 98 - 107 mmol/L    Total CO2 28.4 22.0 - 29.0 mmol/L    Calcium 10.1 8.6 - 10.5 mg/dL   ALT    Specimen: Blood   Result Value Ref Range    ALT (SGPT) 26 1 - 41 U/L   Hemoglobin A1c    Specimen: Blood   Result Value Ref Range    Hemoglobin A1C 6.10 (H) 4.80 - 5.60 %   MicroAlbumin, Urine, Random - Urine, Clean Catch    Specimen: Urine, Clean Catch   Result Value Ref Range    Microalbumin, Urine 17.8 Not Estab. ug/mL   Vitamin B12    Specimen: Blood   Result Value Ref Range    Vitamin B-12 395 211 - 946 pg/mL   Folate    Specimen: Blood   Result Value Ref Range    Folate >20.00 4.78 - 24.20 ng/mL   Vitamin D,25-Hydroxy    Specimen: Blood   Result Value Ref Range    25 Hydroxy, Vitamin D 49.0 30.0 - 100.0 ng/ml

## 2024-05-17 NOTE — PROGRESS NOTES
"  Subjective   Tim Kelly is a 74 y.o. male who is here for   Chief Complaint   Patient presents with    Hypertension    Hyperlipidemia    Diabetes   .     History of Present Illness     Tim has moved into his new assisted living facility.  Enjoys living there.  He is receiving physical therapy occupational therapy and speech and swallow therapy    Is enjoying the meals there.    Blood pressures they are all reasonably controlled with systolics 120s to 150s    Blood sugar logs his fastings are usually 1 20-1 40  Arrived today by his son      The following portions of the patient's history were reviewed and updated as appropriate: allergies, current medications, past family history, past medical history, past social history, past surgical history, and problem list.    Review of Systems    Objective   Vitals:    05/17/24 1236   BP: 120/80   BP Location: Left arm   Patient Position: Sitting   Cuff Size: Adult   Pulse: 75   Temp: 97.2 °F (36.2 °C)   SpO2: 99%   Weight: 79.8 kg (176 lb)   Height: 185.4 cm (72.99\")      Physical Exam  Vitals reviewed.   Cardiovascular:      Rate and Rhythm: Normal rate.   Pulmonary:      Effort: Pulmonary effort is normal.   Neurological:      Mental Status: He is alert.      Motor: Weakness present.      Coordination: Coordination abnormal.   Psychiatric:         Speech: Speech is delayed.         Cognition and Memory: Memory is impaired.         Assessment & Plan   Diagnoses and all orders for this visit:    1. Cognitive change (Primary)    2. Type 2 diabetes mellitus without complication, without long-term current use of insulin  -     metFORMIN ER (GLUCOPHAGE-XR) 750 MG 24 hr tablet; Take 1 tablet by mouth Daily With Breakfast. Indications: Type 2 Diabetes  Dispense: 90 tablet; Refill: 3  -     MicroAlbumin, Urine, Random - Urine, Clean Catch; Future  -     Basic Metabolic Panel; Future  -     Lipid Panel; Future  -     Hemoglobin A1c; Future    3. Mixed hyperlipidemia  -    "  pravastatin (PRAVACHOL) 80 MG tablet; Take 1 tablet by mouth Daily. Indications: High Amount of Fats in the Blood  Dispense: 90 tablet; Refill: 3    4. Screening for prostate cancer  -     PSA Screen; Future    Doing well at new assisted living  participating in PT and OT and speech and swallow service  Only 1 fall    Patient Instructions     Results for orders placed or performed in visit on 05/10/24   Lipid Panel    Specimen: Blood   Result Value Ref Range    Total Cholesterol 166 0 - 200 mg/dL    Triglycerides 168 (H) 0 - 150 mg/dL    HDL Cholesterol 48 40 - 60 mg/dL    VLDL Cholesterol Vick 29 5 - 40 mg/dL    LDL Chol Calc (NIH) 89 0 - 100 mg/dL   Basic Metabolic Panel    Specimen: Blood   Result Value Ref Range    Glucose 125 (H) 65 - 99 mg/dL    BUN 19 8 - 23 mg/dL    Creatinine 1.28 (H) 0.76 - 1.27 mg/dL    EGFR Result 58.7 (L) >60.0 mL/min/1.73    BUN/Creatinine Ratio 14.8 7.0 - 25.0    Sodium 139 136 - 145 mmol/L    Potassium 4.7 3.5 - 5.2 mmol/L    Chloride 99 98 - 107 mmol/L    Total CO2 28.4 22.0 - 29.0 mmol/L    Calcium 10.1 8.6 - 10.5 mg/dL   ALT    Specimen: Blood   Result Value Ref Range    ALT (SGPT) 26 1 - 41 U/L   Hemoglobin A1c    Specimen: Blood   Result Value Ref Range    Hemoglobin A1C 6.10 (H) 4.80 - 5.60 %   MicroAlbumin, Urine, Random - Urine, Clean Catch    Specimen: Urine, Clean Catch   Result Value Ref Range    Microalbumin, Urine 17.8 Not Estab. ug/mL   Vitamin B12    Specimen: Blood   Result Value Ref Range    Vitamin B-12 395 211 - 946 pg/mL   Folate    Specimen: Blood   Result Value Ref Range    Folate >20.00 4.78 - 24.20 ng/mL   Vitamin D,25-Hydroxy    Specimen: Blood   Result Value Ref Range    25 Hydroxy, Vitamin D 49.0 30.0 - 100.0 ng/ml         Medications Discontinued During This Encounter   Medication Reason    metFORMIN ER (GLUCOPHAGE-XR) 750 MG 24 hr tablet Reorder    pravastatin (PRAVACHOL) 80 MG tablet Reorder        Return in about 6 months (around 11/17/2024) for  Medicare Wellness visit, diabetes.    Dr. Gagandeep Romero  [facility]  [place], [place]

## 2024-06-09 DIAGNOSIS — I10 ESSENTIAL HYPERTENSION: ICD-10-CM

## 2024-06-10 RX ORDER — LISINOPRIL 5 MG/1
5 TABLET ORAL DAILY
Qty: 90 TABLET | Refills: 3 | OUTPATIENT
Start: 2024-06-10

## 2024-06-20 ENCOUNTER — PATIENT MESSAGE (OUTPATIENT)
Dept: FAMILY MEDICINE CLINIC | Facility: CLINIC | Age: 74
End: 2024-06-20
Payer: MEDICARE

## 2024-07-01 DIAGNOSIS — I10 ESSENTIAL HYPERTENSION: ICD-10-CM

## 2024-07-02 ENCOUNTER — TELEPHONE (OUTPATIENT)
Dept: FAMILY MEDICINE CLINIC | Facility: CLINIC | Age: 74
End: 2024-07-02

## 2024-07-02 RX ORDER — LISINOPRIL 20 MG/1
20 TABLET ORAL DAILY
Qty: 90 TABLET | Refills: 3 | Status: SHIPPED | OUTPATIENT
Start: 2024-07-02

## 2024-07-02 NOTE — TELEPHONE ENCOUNTER
Provider: DR ODONNELL    Caller: GEMMA    Relationship to Patient: EMPLOYEE AT [facility] [employer] [facility] [employer]        Phone Number: [phone]    Reason for Call: GEMMA WITH [facility] IS CALLING TO STATE PATIENT TOOK A FALL TODAY.  SHE STATES PATIENT IS ORIENTED AND VITALS ARE WITHIN NORMAL LIMITS FOR PATIENT.  SHE STATESVHE DID GET A SMALL CUT ON FOREHEAD.  HE HAS BEEN TAKING ASPIRIN , BUT NO OTHER MEDICATIONS NEEDED.  SHE ALSO STATES  WITH PATIENT TAKING  BLOOD THINNERS IT IS POLICY OF [facility] FOR PATIENT TO GO OUT FOR EVALUATION BUT PATIENT AND HIS SON WHO IS PATIENT'S POA, DECLINED PATIENT GO OUT FOR EVALUATION.    PLEASE ADVISE.

## 2024-07-02 NOTE — TELEPHONE ENCOUNTER
Yes we will refill his lisinopril.  With previously on 10 mg.  Based on last patient message she would like to increase his lisinopril to 20 mg  Therefore we will send in the new higher dose of 20 mg into [facility] pharmacy.

## 2024-07-31 DIAGNOSIS — E11.9 TYPE 2 DIABETES MELLITUS WITHOUT COMPLICATION, WITHOUT LONG-TERM CURRENT USE OF INSULIN: ICD-10-CM

## 2024-07-31 DIAGNOSIS — I10 ESSENTIAL HYPERTENSION: ICD-10-CM

## 2024-07-31 RX ORDER — METFORMIN HYDROCHLORIDE 750 MG/1
750 TABLET, EXTENDED RELEASE ORAL
Qty: 90 TABLET | Refills: 3 | OUTPATIENT
Start: 2024-07-31

## 2024-07-31 RX ORDER — LISINOPRIL 20 MG/1
20 TABLET ORAL DAILY
Qty: 90 TABLET | Refills: 3 | OUTPATIENT
Start: 2024-07-31

## 2024-07-31 NOTE — TELEPHONE ENCOUNTER
Caller: KellyVanessa ON  [employer]    Relationship: Emergency Contact    Best call back number: [phone]     Requested Prescriptions:   Requested Prescriptions     Pending Prescriptions Disp Refills    metFORMIN ER (GLUCOPHAGE-XR) 750 MG 24 hr tablet 90 tablet 3     Sig: Take 1 tablet by mouth Daily With Breakfast. Indications: Type 2 Diabetes    lisinopril (PRINIVIL,ZESTRIL) 20 MG tablet 90 tablet 3     Sig: Take 1 tablet by mouth Daily.        Pharmacy where request should be sent: [facility]/PHARMACY #[id number] [place] [phone] [place] [phone]      Last office visit with prescribing clinician: 5/17/2024   Last telemedicine visit with prescribing clinician: 4/9/2024   Next office visit with prescribing clinician: 12/10/2024     Additional details provided by patient: PATIENT IS GOING TO [place] WITH FAMILY ON VACATION AND WONT HAVE ENOUGH METFORMIN.    PLEASE CALL IN    Does the patient have less than a 3 day supply:  [] Yes  [] No    Would you like a call back once the refill request has been completed: [x] Yes [] No    If the office needs to give you a call back, can they leave a voicemail: [x] Yes [] No    Cristi Edouard Rep   07/31/24 11:09 EDT

## 2024-09-23 DIAGNOSIS — I10 ESSENTIAL HYPERTENSION: ICD-10-CM

## 2024-09-23 RX ORDER — LISINOPRIL 20 MG/1
20 TABLET ORAL DAILY
Qty: 90 TABLET | Refills: 3 | Status: SHIPPED | OUTPATIENT
Start: 2024-09-23

## 2024-10-08 ENCOUNTER — TELEPHONE (OUTPATIENT)
Dept: FAMILY MEDICINE CLINIC | Facility: CLINIC | Age: 74
End: 2024-10-08

## 2024-10-08 DIAGNOSIS — R41.89 COGNITIVE CHANGE: Primary | ICD-10-CM

## 2024-10-08 NOTE — TELEPHONE ENCOUNTER
Caller: FRANCES @[facility]      Best call back number: [phone]    What orders are you requesting (i.e. lab or imaging): PHYSICAL THERAPY AND SPEECH THERAPY     Additional notes: [phone]  MUST HAVE ELECTRONIC SIGNATURE     Okay will renew continue physical therapy and speech therapy for cognitive decline

## 2024-10-16 NOTE — TELEPHONE ENCOUNTER
Caller: FRANCES @[facility]        Best call back number: [phone]     What orders are you requesting (i.e. lab or imaging): FRANCES WOULD LIKE TO ALSO GET OCCUPATIONAL THERAPY FOR THE PATIENT SHE WENT TO SEE HIM TODAY AND HE ASKED FOR OCCUPATIONAL THERAPY ALSO      Additional notes: [phone]  MUST HAVE ELECTRONIC SIGNATURE           Okay renewing his occupational therapy referral has been made

## 2024-11-04 DIAGNOSIS — E78.2 MIXED HYPERLIPIDEMIA: ICD-10-CM

## 2024-11-04 DIAGNOSIS — E11.9 TYPE 2 DIABETES MELLITUS WITHOUT COMPLICATION, WITHOUT LONG-TERM CURRENT USE OF INSULIN: ICD-10-CM

## 2024-11-04 DIAGNOSIS — R41.89 COGNITIVE CHANGE: ICD-10-CM

## 2024-11-04 RX ORDER — DONEPEZIL HYDROCHLORIDE 10 MG/1
10 TABLET, FILM COATED ORAL NIGHTLY
Qty: 90 TABLET | Refills: 3 | Status: SHIPPED | OUTPATIENT
Start: 2024-11-04

## 2024-11-04 RX ORDER — METFORMIN HYDROCHLORIDE 750 MG/1
750 TABLET, EXTENDED RELEASE ORAL
Qty: 90 TABLET | Refills: 3 | Status: SHIPPED | OUTPATIENT
Start: 2024-11-04

## 2024-11-04 RX ORDER — PRAVASTATIN SODIUM 80 MG/1
80 TABLET ORAL DAILY
Qty: 90 TABLET | Refills: 3 | Status: SHIPPED | OUTPATIENT
Start: 2024-11-04

## 2024-11-04 NOTE — TELEPHONE ENCOUNTER
[facility] - CHANGE IN PHARMACY         Caller: Kaila Kelly (Medical POA)    Relationship: Emergency Contact    Best call back number:     [phone] (Home)       Requested Prescriptions:   Requested Prescriptions     Pending Prescriptions Disp Refills    pravastatin (PRAVACHOL) 80 MG tablet 90 tablet 3     Sig: Take 1 tablet by mouth Daily. Indications: High Amount of Fats in the Blood    metFORMIN ER (GLUCOPHAGE-XR) 750 MG 24 hr tablet 90 tablet 3     Sig: Take 1 tablet by mouth Daily With Breakfast. Indications: Type 2 Diabetes    donepezil (ARICEPT) 10 MG tablet       Sig: Take 1 tablet by mouth Every Night. Indications: Alzheimer's Disease        Pharmacy where request should be sent: [facility] [id number] - [facility] [place]  [address]  AT [place] [id number] [place] [address] [phone]      Last office visit with prescribing clinician: 5/17/2024   Last telemedicine visit with prescribing clinician: Visit date not found   Next office visit with prescribing clinician: 12/10/2024     Additional details provided by patient: SOME / CHANGING PHARMACIES     Does the patient have less than a 3 day supply:  [] Yes  [] No    Would you like a call back once the refill request has been completed: [] Yes [] No    If the office needs to give you a call back, can they leave a voicemail: [] Yes [] No    Cristi Guardado Rep   11/04/24 10:12 EST

## 2024-11-15 ENCOUNTER — TELEPHONE (OUTPATIENT)
Dept: FAMILY MEDICINE CLINIC | Facility: CLINIC | Age: 74
End: 2024-11-15

## 2024-11-15 NOTE — TELEPHONE ENCOUNTER
Caller: Kaila Kelly (Medical POA)    Relationship: Emergency Contact    Best call back number: [phone]    What orders are you requesting (i.e. lab or imaging): URINALYSIS    In what timeframe would the patient need to come in: AS SOON AS AVAILABLE    Where will you receive your lab/imaging services: [facility]    Additional notes: STATED THAT THEY WOULD LIKE TO SEE ABOUT GETTING THE ORDER PLACED FOR THE PATIENT AS HE IS EXPERIENCING SYMPTOMS OF A URINARY TRACT INFECTION. PLEASE CALL AND ADVISE

## 2024-11-25 DIAGNOSIS — I10 ESSENTIAL HYPERTENSION: ICD-10-CM

## 2024-11-25 DIAGNOSIS — E11.9 TYPE 2 DIABETES MELLITUS WITHOUT COMPLICATION, WITHOUT LONG-TERM CURRENT USE OF INSULIN: ICD-10-CM

## 2024-11-25 RX ORDER — LISINOPRIL 20 MG/1
20 TABLET ORAL DAILY
Qty: 90 TABLET | Refills: 3 | Status: SHIPPED | OUTPATIENT
Start: 2024-11-25

## 2024-11-25 RX ORDER — METFORMIN HYDROCHLORIDE 750 MG/1
750 TABLET, EXTENDED RELEASE ORAL
Qty: 90 TABLET | Refills: 3 | Status: SHIPPED | OUTPATIENT
Start: 2024-11-25

## 2024-11-25 NOTE — TELEPHONE ENCOUNTER
Caller: Kaila Kelly (Medical POA)    Relationship: Emergency Contact    Best call back number: [phone] (home)       Requested Prescriptions:   Requested Prescriptions     Pending Prescriptions Disp Refills    metFORMIN ER (GLUCOPHAGE-XR) 750 MG 24 hr tablet 90 tablet 3     Sig: Take 1 tablet by mouth Daily With Breakfast. Indications: Type 2 Diabetes    lisinopril (PRINIVIL,ZESTRIL) 20 MG tablet 90 tablet 3     Sig: Take 1 tablet by mouth Daily.        Pharmacy where request should be sent: [facility] #[id number] [place] [address] [address]  AT [facility] [address] - [phone] [facility] [phone] FX     Last office visit with prescribing clinician: 5/17/2024   Last telemedicine visit with prescribing clinician: Visit date not found   Next office visit with prescribing clinician: 12/10/2024     Additional details provided by patient: LESS THAN A 3 DAY OF THE METFORMIN    Does the patient have less than a 3 day supply:  [x] Yes  [] No    Would you like a call back once the refill request has been completed: [] Yes [x] No    If the office needs to give you a call back, can they leave a voicemail: [] Yes [x] No    Cristi Roche Rep   11/25/24 09:43 EST

## 2024-12-02 DIAGNOSIS — I10 ESSENTIAL HYPERTENSION: ICD-10-CM

## 2024-12-02 DIAGNOSIS — E11.9 TYPE 2 DIABETES MELLITUS WITHOUT COMPLICATION, WITHOUT LONG-TERM CURRENT USE OF INSULIN: Primary | ICD-10-CM

## 2024-12-02 DIAGNOSIS — E11.9 TYPE 2 DIABETES MELLITUS WITHOUT COMPLICATION, WITHOUT LONG-TERM CURRENT USE OF INSULIN: ICD-10-CM

## 2024-12-02 DIAGNOSIS — E78.2 MIXED HYPERLIPIDEMIA: ICD-10-CM

## 2024-12-02 DIAGNOSIS — Z12.5 SCREENING FOR PROSTATE CANCER: ICD-10-CM

## 2024-12-04 LAB
ALBUMIN SERPL-MCNC: 4.4 G/DL (ref 3.5–5.2)
ALBUMIN/GLOB SERPL: 1.7 G/DL
ALP SERPL-CCNC: 126 U/L (ref 39–117)
ALT SERPL-CCNC: 23 U/L (ref 1–41)
APPEARANCE UR: CLEAR
AST SERPL-CCNC: 24 U/L (ref 1–40)
BACTERIA #/AREA URNS HPF: NORMAL /HPF
BILIRUB SERPL-MCNC: 1.2 MG/DL (ref 0–1.2)
BILIRUB UR QL STRIP: NEGATIVE
BUN SERPL-MCNC: 19 MG/DL (ref 8–23)
BUN/CREAT SERPL: 16.8 (ref 7–25)
CALCIUM SERPL-MCNC: 9.8 MG/DL (ref 8.6–10.5)
CASTS URNS MICRO: NORMAL
CHLORIDE SERPL-SCNC: 102 MMOL/L (ref 98–107)
CHOLEST SERPL-MCNC: 165 MG/DL (ref 0–200)
CO2 SERPL-SCNC: 25.9 MMOL/L (ref 22–29)
COLOR UR: YELLOW
CREAT SERPL-MCNC: 1.13 MG/DL (ref 0.76–1.27)
EGFRCR SERPLBLD CKD-EPI 2021: 68.2 ML/MIN/1.73
EPI CELLS #/AREA URNS HPF: NORMAL /HPF
ERYTHROCYTE [DISTWIDTH] IN BLOOD BY AUTOMATED COUNT: 12 % (ref 12.3–15.4)
GLOBULIN SER CALC-MCNC: 2.6 GM/DL
GLUCOSE SERPL-MCNC: 117 MG/DL (ref 65–99)
GLUCOSE UR QL STRIP: NEGATIVE
HBA1C MFR BLD: 5.9 % (ref 4.8–5.6)
HCT VFR BLD AUTO: 41.4 % (ref 37.5–51)
HDLC SERPL-MCNC: 54 MG/DL (ref 40–60)
HGB BLD-MCNC: 13.4 G/DL (ref 13–17.7)
HGB UR QL STRIP: NEGATIVE
KETONES UR QL STRIP: ABNORMAL
LDLC SERPL CALC-MCNC: 93 MG/DL (ref 0–100)
LDLC/HDLC SERPL: 1.68 {RATIO}
LEUKOCYTE ESTERASE UR QL STRIP: ABNORMAL
MCH RBC QN AUTO: 29.1 PG (ref 26.6–33)
MCHC RBC AUTO-ENTMCNC: 32.4 G/DL (ref 31.5–35.7)
MCV RBC AUTO: 89.8 FL (ref 79–97)
NITRITE UR QL STRIP: NEGATIVE
PH UR STRIP: 6.5 [PH] (ref 5–8)
PLATELET # BLD AUTO: 251 10*3/MM3 (ref 140–450)
POTASSIUM SERPL-SCNC: 4.4 MMOL/L (ref 3.5–5.2)
PROT SERPL-MCNC: 7 G/DL (ref 6–8.5)
PROT UR QL STRIP: NEGATIVE
PSA SERPL-MCNC: 0.4 NG/ML (ref 0–4)
RBC # BLD AUTO: 4.61 10*6/MM3 (ref 4.14–5.8)
RBC #/AREA URNS HPF: NORMAL /HPF
SODIUM SERPL-SCNC: 138 MMOL/L (ref 136–145)
SP GR UR STRIP: 1.02 (ref 1–1.03)
TRIGL SERPL-MCNC: 101 MG/DL (ref 0–150)
TSH SERPL DL<=0.005 MIU/L-ACNC: 2.63 UIU/ML (ref 0.27–4.2)
UROBILINOGEN UR STRIP-MCNC: ABNORMAL MG/DL
VLDLC SERPL CALC-MCNC: 18 MG/DL (ref 5–40)
WBC # BLD AUTO: 5.12 10*3/MM3 (ref 3.4–10.8)
WBC #/AREA URNS HPF: NORMAL /HPF

## 2024-12-10 ENCOUNTER — OFFICE VISIT (OUTPATIENT)
Dept: FAMILY MEDICINE CLINIC | Facility: CLINIC | Age: 74
End: 2024-12-10
Payer: MEDICARE

## 2024-12-10 VITALS
TEMPERATURE: 98 F | SYSTOLIC BLOOD PRESSURE: 130 MMHG | HEART RATE: 76 BPM | DIASTOLIC BLOOD PRESSURE: 80 MMHG | WEIGHT: 162 LBS | OXYGEN SATURATION: 98 % | HEIGHT: 73 IN | BODY MASS INDEX: 21.47 KG/M2

## 2024-12-10 DIAGNOSIS — E78.2 MIXED HYPERLIPIDEMIA: ICD-10-CM

## 2024-12-10 DIAGNOSIS — Z00.00 MEDICARE ANNUAL WELLNESS VISIT, SUBSEQUENT: Primary | ICD-10-CM

## 2024-12-10 DIAGNOSIS — R41.89 COGNITIVE CHANGE: ICD-10-CM

## 2024-12-10 DIAGNOSIS — S32.040D COMPRESSION FRACTURE OF L4 VERTEBRA WITH ROUTINE HEALING: ICD-10-CM

## 2024-12-10 PROCEDURE — 3079F DIAST BP 80-89 MM HG: CPT | Performed by: FAMILY MEDICINE

## 2024-12-10 PROCEDURE — 1126F AMNT PAIN NOTED NONE PRSNT: CPT | Performed by: FAMILY MEDICINE

## 2024-12-10 PROCEDURE — 3044F HG A1C LEVEL LT 7.0%: CPT | Performed by: FAMILY MEDICINE

## 2024-12-10 PROCEDURE — 1170F FXNL STATUS ASSESSED: CPT | Performed by: FAMILY MEDICINE

## 2024-12-10 PROCEDURE — G0439 PPPS, SUBSEQ VISIT: HCPCS | Performed by: FAMILY MEDICINE

## 2024-12-10 PROCEDURE — 3075F SYST BP GE 130 - 139MM HG: CPT | Performed by: FAMILY MEDICINE

## 2024-12-10 RX ORDER — DONEPEZIL HYDROCHLORIDE 10 MG/1
10 TABLET, FILM COATED ORAL NIGHTLY
Qty: 90 TABLET | Refills: 3 | Status: SHIPPED | OUTPATIENT
Start: 2024-12-10

## 2024-12-10 RX ORDER — ALENDRONATE SODIUM 70 MG/1
70 TABLET ORAL
Qty: 12 TABLET | Refills: 3 | Status: SHIPPED | OUTPATIENT
Start: 2024-12-10

## 2024-12-10 RX ORDER — PRAVASTATIN SODIUM 80 MG/1
80 TABLET ORAL DAILY
Qty: 90 TABLET | Refills: 3 | Status: SHIPPED | OUTPATIENT
Start: 2024-12-10

## 2024-12-10 NOTE — PROGRESS NOTES
Subjective   The ABCs of the Annual Wellness Visit  Medicare Wellness Visit      Tim Kelly is a 74 y.o. patient who presents for a Medicare Wellness Visit.    The following portions of the patient's history were reviewed and   updated as appropriate: allergies, current medications, past medical history, past social history, past surgical history, and problem list.    Compared to one year ago, the patient's physical   health is worse.  Compared to one year ago, the patient's mental   health is worse.    Recent Hospitalizations:  He was not admitted to the hospital during the last year.     Current Medical Providers:  Patient Care Team:  Gagandeep Romero MD as PCP - General (Family Medicine)  Zachariah Nguyen OD (Optometry)  Aaliyah Lew DO (Neurology)  Ludin Bowling MD as Consulting Physician (Cardiac Electrophysiology)  Lionel Ybarra MD as Consulting Physician (Ophthalmology)  Rambo Santillan APRN as Nurse Practitioner (Cardiology)    Outpatient Medications Prior to Visit   Medication Sig Dispense Refill    aspirin 81 MG chewable tablet Chew Daily.      cholecalciferol (VITAMIN D3) 25 MCG (1000 UT) tablet Take 1 tablet by mouth Daily.      lisinopril (PRINIVIL,ZESTRIL) 20 MG tablet Take 1 tablet by mouth Daily. 90 tablet 3    melatonin 5 MG tablet tablet Take 1 tablet by mouth Every Night.      metFORMIN ER (GLUCOPHAGE-XR) 750 MG 24 hr tablet Take 1 tablet by mouth Daily With Breakfast. Indications: Type 2 Diabetes 90 tablet 3    brimonidine (ALPHAGAN) 0.2 % ophthalmic solution Administer  to both eyes.      donepezil (ARICEPT) 10 MG tablet Take 1 tablet by mouth Every Night. Indications: Alzheimer's Disease 90 tablet 3    pravastatin (PRAVACHOL) 80 MG tablet Take 1 tablet by mouth Daily. Indications: High Amount of Fats in the Blood 90 tablet 3     No facility-administered medications prior to visit.     No opioid medication identified on active medication list. I have reviewed chart  "for other potential  high risk medication/s and harmful drug interactions in the elderly.      Aspirin is on active medication list. Aspirin use is indicated based on review of current medical condition/s. Pros and cons of this therapy have been discussed today. Benefits of this medication outweigh potential harm.  Patient has been encouraged to continue taking this medication.  .      Patient Active Problem List   Diagnosis    Mixed hyperlipidemia    Essential hypertension    Screening for prostate cancer    Current smoker    Medication monitoring encounter    Medicare annual wellness visit, subsequent    Type 2 diabetes mellitus with hyperglycemia, without long-term current use of insulin    Hypertriglyceridemia    Encounter for screening for lung cancer    Vertigo    Cognitive change    Small vessel disease, cerebrovascular    Primary open angle glaucoma (POAG) of both eyes, mild stage    Obstructive sleep apnea syndrome    Complete heart block    Fluency disorder associated with underlying disease    Compression fracture of L4 vertebra with routine healing     Advance Care Planning Advance Directive is not on file.  ACP discussion was held with the patient during this visit. Patient has an advance directive (not in EMR), copy requested.            Objective   Vitals:    12/10/24 0944   BP: 130/80   BP Location: Left arm   Patient Position: Sitting   Cuff Size: Adult   Pulse: 76   Temp: 98 °F (36.7 °C)   SpO2: 98%   Weight: 73.5 kg (162 lb)   Height: 185.4 cm (72.99\")   PainSc: 0-No pain       Estimated body mass index is 21.38 kg/m² as calculated from the following:    Height as of this encounter: 185.4 cm (72.99\").    Weight as of this encounter: 73.5 kg (162 lb).    BMI is within normal parameters. No other follow-up for BMI required.       Does the patient have evidence of cognitive impairment? Yes  Lab Results   Component Value Date    CHLPL 165 12/03/2024    TRIG 101 12/03/2024    HDL 54 12/03/2024    LDL " 93 2024    VLDL 18 2024    HGBA1C 5.90 (H) 2024                                                                                               Health  Risk Assessment    Smoking Status:  Social History     Tobacco Use   Smoking Status Former    Current packs/day: 0.00    Average packs/day: 1 pack/day for 55.0 years (55.0 ttl pk-yrs)    Types: Cigarettes    Start date: 1967    Quit date: 2022    Years since quittin.8   Smokeless Tobacco Never   Tobacco Comments    started as a teenager     Alcohol Consumption:  Social History     Substance and Sexual Activity   Alcohol Use Yes    Alcohol/week: 7.0 standard drinks of alcohol    Types: 7 Glasses of wine per week    Comment: 1 a day       Fall Risk Screen  MARIAELENAADI Fall Risk Assessment was completed, and patient is at MODERATE risk for falls. Assessment completed on:12/10/2024    Depression Screening   Little interest or pleasure in doing things? Not at all   Feeling down, depressed, or hopeless? Not at all   PHQ-2 Total Score 0      Health Habits and Functional and Cognitive Screenin/10/2024     9:00 AM   Functional & Cognitive Status   Do you have difficulty preparing food and eating? No   Do you have difficulty bathing yourself, getting dressed or grooming yourself? No   Do you have difficulty using the toilet? No   Do you have difficulty moving around from place to place? No   Do you have trouble with steps or getting out of a bed or a chair? Yes   Current Diet Well Balanced Diet   Dental Exam Up to date   Eye Exam Up to date   Exercise (times per week) 7 times per week   Current Exercises Include Walking   Do you need help using the phone?  No   Are you deaf or do you have serious difficulty hearing?  No   Do you need help to go to places out of walking distance? Yes   Do you need help shopping? Yes   Do you need help preparing meals?  Yes   Do you need help with housework?  Yes   Do you need help with laundry? Yes   Do you  need help taking your medications? No   Do you need help managing money? Yes   Have you felt unusual stress, anger or loneliness in the last month? No   Who do you live with? Community   If you need help, do you have trouble finding someone available to you? No   Do you have difficulty concentrating, remembering or making decisions? No           Age-appropriate Screening Schedule:  Refer to the list below for future screening recommendations based on patient's age, sex and/or medical conditions. Orders for these recommended tests are listed in the plan section. The patient has been provided with a written plan.    Health Maintenance List  Health Maintenance   Topic Date Due    DIABETIC FOOT EXAM  Never done    AAA SCREEN (ONE-TIME)  Never done    DIABETIC EYE EXAM  06/11/2022    ANNUAL WELLNESS VISIT  12/08/2024    LUNG CANCER SCREENING  01/25/2025    HEPATITIS C SCREENING  12/09/2025 (Originally 2/17/2016)    ZOSTER VACCINE (2 of 3) 12/09/2025 (Originally 8/10/2016)    HEMOGLOBIN A1C  06/03/2025    LIPID PANEL  12/03/2025    COLORECTAL CANCER SCREENING  02/10/2030    TDAP/TD VACCINES (2 - Td or Tdap) 07/08/2033    COVID-19 Vaccine  Completed    INFLUENZA VACCINE  Completed    Pneumococcal Vaccine 65+  Completed    URINE MICROALBUMIN  Discontinued                                                                                                                                                CMS Preventative Services Quick Reference  Risk Factors Identified During Encounter  Fall Risk-High or Moderate: Discussed Fall Prevention in the home    The above risks/problems have been discussed with the patient.  Pertinent information has been shared with the patient in the After Visit Summary.  An After Visit Summary and PPPS were made available to the patient.    Follow Up:   Next Medicare Wellness visit to be scheduled in 1 year.     Assessment & Plan  Medicare annual wellness visit, subsequent         Compression fracture  of L4 vertebra with routine healing    Orders:    alendronate (Fosamax) 70 MG tablet; Take 1 tablet by mouth Every 7 (Seven) Days. Indications: Osteoporosis    Cognitive change    Orders:    donepezil (ARICEPT) 10 MG tablet; Take 1 tablet by mouth Every Night. Indications: Alzheimer's Disease    Ambulatory Referral to Palliative Care    Mixed hyperlipidemia   Lipid abnormalities are stable    Plan:  Continue same medication/s without change.      Discussed medication dosage, use, side effects, and goals of treatment in detail.    Counseled patient on lifestyle modifications to help control hyperlipidemia.     Patient Treatment Goals:   LDL goal is under 100    Followup in 1 year.    Orders:    pravastatin (PRAVACHOL) 80 MG tablet; Take 1 tablet by mouth Daily. Indications: High Amount of Fats in the Blood         Follow Up:   No follow-ups on file.    Tim brought in by his son.  He is still doing fairly well with physical therapy and Occupational Therapy and speech therapy  His cognitive decline continues to worsen  Memory is okay  But speech is becoming more aphasic.  Lots of falling.  Gait is abnormal  Up-to-date with cardiology and neurology  his last fall ended up resulting in a lumbar 4 compression fracture  We will start alendronate today, remain on vitamin D    It is time for a palliative care consultation    Handicap parking permit also given

## 2024-12-10 NOTE — ASSESSMENT & PLAN NOTE
Orders:    donepezil (ARICEPT) 10 MG tablet; Take 1 tablet by mouth Every Night. Indications: Alzheimer's Disease    Ambulatory Referral to Palliative Care

## 2024-12-10 NOTE — ASSESSMENT & PLAN NOTE
Lipid abnormalities are stable    Plan:  Continue same medication/s without change.      Discussed medication dosage, use, side effects, and goals of treatment in detail.    Counseled patient on lifestyle modifications to help control hyperlipidemia.     Patient Treatment Goals:   LDL goal is under 100    Followup in 1 year.    Orders:    pravastatin (PRAVACHOL) 80 MG tablet; Take 1 tablet by mouth Daily. Indications: High Amount of Fats in the Blood

## 2024-12-10 NOTE — ASSESSMENT & PLAN NOTE
Orders:    alendronate (Fosamax) 70 MG tablet; Take 1 tablet by mouth Every 7 (Seven) Days. Indications: Osteoporosis

## 2024-12-17 ENCOUNTER — READMISSION MANAGEMENT (OUTPATIENT)
Dept: CALL CENTER | Facility: HOSPITAL | Age: 74
End: 2024-12-17
Payer: MEDICARE

## 2024-12-17 NOTE — OUTREACH NOTE
Prep Survey      Flowsheet Row Responses   [facility] facility patient discharged from? Non-  [[facility]]   Is LACE score < 7 ? Non- Discharge   Eligibility Not Eligible   What are the reasons patient is not eligible? [facility]  [[facility]hab facility[facility]   Does the patient have one of the following disease processes/diagnoses(primary or secondary)? General Surgery   Prep survey completed? Yes            Rayna DIOP - Registered Nurse